# Patient Record
Sex: MALE | Race: WHITE | Employment: OTHER | URBAN - NONMETROPOLITAN AREA
[De-identification: names, ages, dates, MRNs, and addresses within clinical notes are randomized per-mention and may not be internally consistent; named-entity substitution may affect disease eponyms.]

---

## 2018-10-09 ENCOUNTER — HOSPITAL ENCOUNTER (INPATIENT)
Age: 78
LOS: 8 days | Discharge: NURSING, REHAB OR LONG-TERM CARE | DRG: 481 | End: 2018-10-17
Attending: EMERGENCY MEDICINE | Admitting: ORTHOPAEDIC SURGERY
Payer: MEDICARE

## 2018-10-09 ENCOUNTER — APPOINTMENT (OUTPATIENT)
Dept: CT IMAGING | Age: 78
DRG: 481 | End: 2018-10-09
Payer: MEDICARE

## 2018-10-09 ENCOUNTER — APPOINTMENT (OUTPATIENT)
Dept: GENERAL RADIOLOGY | Age: 78
DRG: 481 | End: 2018-10-09
Payer: MEDICARE

## 2018-10-09 DIAGNOSIS — S72.001A HIP FRACTURE REQUIRING OPERATIVE REPAIR, RIGHT, CLOSED, INITIAL ENCOUNTER (HCC): ICD-10-CM

## 2018-10-09 DIAGNOSIS — M79.604 RIGHT LEG PAIN: Primary | ICD-10-CM

## 2018-10-09 LAB
ANION GAP SERPL CALCULATED.3IONS-SCNC: 12 MEQ/L (ref 8–16)
BASOPHILS # BLD: 0.3 %
BASOPHILS ABSOLUTE: 0 THOU/MM3 (ref 0–0.1)
BUN BLDV-MCNC: 37 MG/DL (ref 7–22)
CALCIUM SERPL-MCNC: 9.2 MG/DL (ref 8.5–10.5)
CHLORIDE BLD-SCNC: 102 MEQ/L (ref 98–111)
CO2: 29 MEQ/L (ref 23–33)
CREAT SERPL-MCNC: 1.6 MG/DL (ref 0.4–1.2)
ELLIPTOCYTES: ABNORMAL
EOSINOPHIL # BLD: 1.7 %
EOSINOPHILS ABSOLUTE: 0.1 THOU/MM3 (ref 0–0.4)
ERYTHROCYTE [DISTWIDTH] IN BLOOD BY AUTOMATED COUNT: 15.6 % (ref 11.5–14.5)
ERYTHROCYTE [DISTWIDTH] IN BLOOD BY AUTOMATED COUNT: 48 FL (ref 35–45)
GFR SERPL CREATININE-BSD FRML MDRD: 42 ML/MIN/1.73M2
GLUCOSE BLD-MCNC: 129 MG/DL (ref 70–108)
HCT VFR BLD CALC: 36.2 % (ref 42–52)
HEMOGLOBIN: 12.8 GM/DL (ref 14–18)
IMMATURE GRANS (ABS): 0.03 THOU/MM3 (ref 0–0.07)
IMMATURE GRANULOCYTES: 0.5 %
LYMPHOCYTES # BLD: 15.5 %
LYMPHOCYTES ABSOLUTE: 1 THOU/MM3 (ref 1–4.8)
MCH RBC QN AUTO: 30.2 PG (ref 26–33)
MCHC RBC AUTO-ENTMCNC: 35.4 GM/DL (ref 32.2–35.5)
MCV RBC AUTO: 85.4 FL (ref 80–94)
MONOCYTES # BLD: 8.6 %
MONOCYTES ABSOLUTE: 0.6 THOU/MM3 (ref 0.4–1.3)
NUCLEATED RED BLOOD CELLS: 0 /100 WBC
OSMOLALITY CALCULATION: 295.4 MOSMOL/KG (ref 275–300)
PLATELET # BLD: 82 THOU/MM3 (ref 130–400)
PLATELET ESTIMATE: ABNORMAL
PMV BLD AUTO: 9.7 FL (ref 9.4–12.4)
POTASSIUM SERPL-SCNC: 4.4 MEQ/L (ref 3.5–5.2)
RBC # BLD: 4.24 MILL/MM3 (ref 4.7–6.1)
SCAN OF BLOOD SMEAR: NORMAL
SEG NEUTROPHILS: 73.4 %
SEGMENTED NEUTROPHILS ABSOLUTE COUNT: 4.9 THOU/MM3 (ref 1.8–7.7)
SODIUM BLD-SCNC: 143 MEQ/L (ref 135–145)
SPHEROCYTES: ABNORMAL
WBC # BLD: 6.7 THOU/MM3 (ref 4.8–10.8)

## 2018-10-09 PROCEDURE — 99285 EMERGENCY DEPT VISIT HI MDM: CPT

## 2018-10-09 PROCEDURE — 70450 CT HEAD/BRAIN W/O DYE: CPT

## 2018-10-09 PROCEDURE — 96374 THER/PROPH/DIAG INJ IV PUSH: CPT

## 2018-10-09 PROCEDURE — 80048 BASIC METABOLIC PNL TOTAL CA: CPT

## 2018-10-09 PROCEDURE — 6370000000 HC RX 637 (ALT 250 FOR IP): Performed by: PHYSICIAN ASSISTANT

## 2018-10-09 PROCEDURE — 85025 COMPLETE CBC W/AUTO DIFF WBC: CPT

## 2018-10-09 PROCEDURE — 6360000002 HC RX W HCPCS: Performed by: EMERGENCY MEDICINE

## 2018-10-09 PROCEDURE — 96375 TX/PRO/DX INJ NEW DRUG ADDON: CPT

## 2018-10-09 PROCEDURE — 99223 1ST HOSP IP/OBS HIGH 75: CPT | Performed by: HOSPITALIST

## 2018-10-09 PROCEDURE — 51702 INSERT TEMP BLADDER CATH: CPT

## 2018-10-09 PROCEDURE — 6360000002 HC RX W HCPCS: Performed by: PHYSICIAN ASSISTANT

## 2018-10-09 PROCEDURE — 2709999900 HC NON-CHARGEABLE SUPPLY

## 2018-10-09 PROCEDURE — 1200000003 HC TELEMETRY R&B

## 2018-10-09 PROCEDURE — 2580000003 HC RX 258: Performed by: PHYSICIAN ASSISTANT

## 2018-10-09 PROCEDURE — 73502 X-RAY EXAM HIP UNI 2-3 VIEWS: CPT

## 2018-10-09 PROCEDURE — 2580000003 HC RX 258: Performed by: HOSPITALIST

## 2018-10-09 PROCEDURE — 71045 X-RAY EXAM CHEST 1 VIEW: CPT

## 2018-10-09 PROCEDURE — 36415 COLL VENOUS BLD VENIPUNCTURE: CPT

## 2018-10-09 PROCEDURE — 72125 CT NECK SPINE W/O DYE: CPT

## 2018-10-09 RX ORDER — FUROSEMIDE 20 MG/1
20 TABLET ORAL 2 TIMES DAILY
Status: ON HOLD | COMMUNITY
End: 2018-10-16 | Stop reason: HOSPADM

## 2018-10-09 RX ORDER — SERTRALINE HYDROCHLORIDE 100 MG/1
100 TABLET, FILM COATED ORAL DAILY
Status: DISCONTINUED | OUTPATIENT
Start: 2018-10-10 | End: 2018-10-17 | Stop reason: HOSPADM

## 2018-10-09 RX ORDER — ZOLPIDEM TARTRATE 10 MG/1
TABLET ORAL NIGHTLY PRN
Status: ON HOLD | COMMUNITY
End: 2018-10-16 | Stop reason: ALTCHOICE

## 2018-10-09 RX ORDER — LOSARTAN POTASSIUM 100 MG/1
100 TABLET ORAL DAILY
Status: DISCONTINUED | OUTPATIENT
Start: 2018-10-10 | End: 2018-10-10

## 2018-10-09 RX ORDER — POTASSIUM CHLORIDE 750 MG/1
10 CAPSULE, EXTENDED RELEASE ORAL 2 TIMES DAILY
Status: ON HOLD | COMMUNITY
End: 2018-10-16

## 2018-10-09 RX ORDER — FUROSEMIDE 20 MG/1
20 TABLET ORAL 2 TIMES DAILY
Status: DISCONTINUED | OUTPATIENT
Start: 2018-10-09 | End: 2018-10-10

## 2018-10-09 RX ORDER — HYDROCODONE BITARTRATE AND ACETAMINOPHEN 5; 325 MG/1; MG/1
1 TABLET ORAL EVERY 6 HOURS PRN
Status: DISCONTINUED | OUTPATIENT
Start: 2018-10-09 | End: 2018-10-11

## 2018-10-09 RX ORDER — MORPHINE SULFATE 2 MG/ML
2 INJECTION, SOLUTION INTRAMUSCULAR; INTRAVENOUS
Status: DISCONTINUED | OUTPATIENT
Start: 2018-10-09 | End: 2018-10-11

## 2018-10-09 RX ORDER — AMLODIPINE BESYLATE 2.5 MG/1
2.5 TABLET ORAL DAILY
Status: DISCONTINUED | OUTPATIENT
Start: 2018-10-10 | End: 2018-10-10

## 2018-10-09 RX ORDER — AMLODIPINE BESYLATE 10 MG/1
2.5 TABLET ORAL DAILY
Status: ON HOLD | COMMUNITY
End: 2018-10-16 | Stop reason: HOSPADM

## 2018-10-09 RX ORDER — PREGABALIN 75 MG/1
75 CAPSULE ORAL 2 TIMES DAILY
Status: ON HOLD | COMMUNITY
End: 2018-10-16 | Stop reason: HOSPADM

## 2018-10-09 RX ORDER — TIZANIDINE 4 MG/1
4 TABLET ORAL NIGHTLY
Status: DISCONTINUED | OUTPATIENT
Start: 2018-10-10 | End: 2018-10-17 | Stop reason: HOSPADM

## 2018-10-09 RX ORDER — METOPROLOL TARTRATE 100 MG/1
100 TABLET ORAL 2 TIMES DAILY
Status: DISCONTINUED | OUTPATIENT
Start: 2018-10-10 | End: 2018-10-10

## 2018-10-09 RX ORDER — INDOMETHACIN 75 MG/1
75 CAPSULE, EXTENDED RELEASE ORAL 2 TIMES DAILY WITH MEALS
COMMUNITY

## 2018-10-09 RX ORDER — LOSARTAN POTASSIUM 100 MG/1
100 TABLET ORAL DAILY
Status: ON HOLD | COMMUNITY
End: 2018-10-16 | Stop reason: HOSPADM

## 2018-10-09 RX ORDER — MORPHINE SULFATE 2 MG/ML
1 INJECTION, SOLUTION INTRAMUSCULAR; INTRAVENOUS
Status: DISCONTINUED | OUTPATIENT
Start: 2018-10-09 | End: 2018-10-17 | Stop reason: HOSPADM

## 2018-10-09 RX ORDER — SERTRALINE HYDROCHLORIDE 100 MG/1
100 TABLET, FILM COATED ORAL DAILY
COMMUNITY

## 2018-10-09 RX ORDER — MORPHINE SULFATE 4 MG/ML
4 INJECTION, SOLUTION INTRAMUSCULAR; INTRAVENOUS ONCE
Status: COMPLETED | OUTPATIENT
Start: 2018-10-09 | End: 2018-10-09

## 2018-10-09 RX ORDER — POTASSIUM CHLORIDE 750 MG/1
10 TABLET, FILM COATED, EXTENDED RELEASE ORAL 2 TIMES DAILY
Status: DISCONTINUED | OUTPATIENT
Start: 2018-10-09 | End: 2018-10-17 | Stop reason: HOSPADM

## 2018-10-09 RX ORDER — 0.9 % SODIUM CHLORIDE 0.9 %
500 INTRAVENOUS SOLUTION INTRAVENOUS ONCE
Status: COMPLETED | OUTPATIENT
Start: 2018-10-10 | End: 2018-10-10

## 2018-10-09 RX ORDER — ONDANSETRON 2 MG/ML
4 INJECTION INTRAMUSCULAR; INTRAVENOUS ONCE
Status: COMPLETED | OUTPATIENT
Start: 2018-10-09 | End: 2018-10-09

## 2018-10-09 RX ORDER — METOPROLOL TARTRATE 100 MG/1
100 TABLET ORAL DAILY
COMMUNITY

## 2018-10-09 RX ORDER — TIZANIDINE 4 MG/1
4 TABLET ORAL EVERY 6 HOURS PRN
Status: ON HOLD | COMMUNITY
End: 2018-10-16 | Stop reason: ALTCHOICE

## 2018-10-09 RX ORDER — CLOPIDOGREL BISULFATE 75 MG/1
75 TABLET ORAL DAILY
COMMUNITY

## 2018-10-09 RX ORDER — SODIUM CHLORIDE 9 MG/ML
INJECTION, SOLUTION INTRAVENOUS CONTINUOUS
Status: ACTIVE | OUTPATIENT
Start: 2018-10-09 | End: 2018-10-10

## 2018-10-09 RX ADMIN — HYDROCODONE BITARTRATE AND ACETAMINOPHEN 1 TABLET: 5; 325 TABLET ORAL at 22:36

## 2018-10-09 RX ADMIN — SODIUM CHLORIDE 500 ML: 9 INJECTION, SOLUTION INTRAVENOUS at 23:54

## 2018-10-09 RX ADMIN — ONDANSETRON HYDROCHLORIDE 4 MG: 4 INJECTION, SOLUTION INTRAMUSCULAR; INTRAVENOUS at 17:22

## 2018-10-09 RX ADMIN — MORPHINE SULFATE 4 MG: 4 INJECTION, SOLUTION INTRAMUSCULAR; INTRAVENOUS at 17:21

## 2018-10-09 RX ADMIN — MORPHINE SULFATE 4 MG: 4 INJECTION, SOLUTION INTRAMUSCULAR; INTRAVENOUS at 19:33

## 2018-10-09 RX ADMIN — SODIUM CHLORIDE: 9 INJECTION, SOLUTION INTRAVENOUS at 21:36

## 2018-10-09 RX ADMIN — MORPHINE SULFATE 2 MG: 2 INJECTION, SOLUTION INTRAMUSCULAR; INTRAVENOUS at 21:36

## 2018-10-09 ASSESSMENT — PAIN DESCRIPTION - FREQUENCY: FREQUENCY: INTERMITTENT

## 2018-10-09 ASSESSMENT — PAIN DESCRIPTION - PROGRESSION: CLINICAL_PROGRESSION: GRADUALLY WORSENING

## 2018-10-09 ASSESSMENT — PAIN SCALES - GENERAL
PAINLEVEL_OUTOF10: 7
PAINLEVEL_OUTOF10: 8
PAINLEVEL_OUTOF10: 9
PAINLEVEL_OUTOF10: 8

## 2018-10-09 ASSESSMENT — PAIN DESCRIPTION - LOCATION: LOCATION: HIP

## 2018-10-09 ASSESSMENT — PAIN DESCRIPTION - ONSET: ONSET: GRADUAL

## 2018-10-09 ASSESSMENT — PAIN DESCRIPTION - PAIN TYPE: TYPE: ACUTE PAIN

## 2018-10-09 ASSESSMENT — PAIN DESCRIPTION - ORIENTATION: ORIENTATION: RIGHT

## 2018-10-09 ASSESSMENT — PAIN DESCRIPTION - DESCRIPTORS: DESCRIPTORS: SHARP

## 2018-10-09 NOTE — ED NOTES
Patient to ed room 8 from main lobby. Patient fell in the lobby and complains of right hip and leg pain and laceration to back of head. Patient in gown on monitor, vital signs obtained and assessment completed.      Ladonna Fallon RN  10/09/18 [id number]

## 2018-10-09 NOTE — LETTER
suppliers that help patients recover after discharge from the hospital, including skilled nursing facilities, home health agencies, inpatient rehabilitation facilities, and long term care hospitals. [facility] is working closely with the doctors and other health care providers that care for you during and following your hospital stay and for a period of time after you leave the hospital. By working together, the health care providers are trying to more efficiently provide well-managed, high quality, patient-centered care as you undergo treatment. Hospitals, doctors, and other health care providers that care for you following a hospital stay may receive an additional payment for providing better, more coordinated health care. Medicare will monitor your care to make sure you and others get high quality care. Your feedback is important     Medicare may also ask you to answer a survey about the services and care you received from [facility]. The survey will be mailed to you. Your feedback will improve care for all people with Medicare who receive care from [facility]. Completion of this survey is optional.     Get more information     For more information about the Bundled Payments for [facility], you can:    · Visit the CMS BPCI Advanced Website at http://faye-landis.net/ initiatives/bpci-advanced   · Call the [facility] BPCI-A team at (332) [phone]. · Call 1-800-MEDICARE (4-282.800.7517). TTY users can call 6-182.685.7443     If you have concerns or complaints about your care, talk to your health care provider, or contact your Beneficiary and Family Centered Quality Improvement Organization NOEMI BARRERA [facility]). To get your CC-QIO's phone number, visit Medicare.gov/contacts or call 1-800-MEDICARE. · To find a different hospital, visit [email or web address] or call

## 2018-10-10 LAB
ANION GAP SERPL CALCULATED.3IONS-SCNC: 11 MEQ/L (ref 8–16)
ANION GAP SERPL CALCULATED.3IONS-SCNC: 11 MEQ/L (ref 8–16)
BUN BLDV-MCNC: 43 MG/DL (ref 7–22)
BUN BLDV-MCNC: 46 MG/DL (ref 7–22)
CALCIUM SERPL-MCNC: 8.2 MG/DL (ref 8.5–10.5)
CALCIUM SERPL-MCNC: 8.3 MG/DL (ref 8.5–10.5)
CHLORIDE BLD-SCNC: 103 MEQ/L (ref 98–111)
CHLORIDE BLD-SCNC: 104 MEQ/L (ref 98–111)
CO2: 27 MEQ/L (ref 23–33)
CO2: 28 MEQ/L (ref 23–33)
CREAT SERPL-MCNC: 2.7 MG/DL (ref 0.4–1.2)
CREAT SERPL-MCNC: 2.7 MG/DL (ref 0.4–1.2)
CREATININE URINE: 189 MG/DL
EKG ATRIAL RATE: 55 BPM
EKG P AXIS: 13 DEGREES
EKG P-R INTERVAL: 208 MS
EKG Q-T INTERVAL: 452 MS
EKG QRS DURATION: 94 MS
EKG QTC CALCULATION (BAZETT): 432 MS
EKG R AXIS: 25 DEGREES
EKG T AXIS: 34 DEGREES
EKG VENTRICULAR RATE: 55 BPM
ERYTHROCYTE [DISTWIDTH] IN BLOOD BY AUTOMATED COUNT: 16.4 % (ref 11.5–14.5)
ERYTHROCYTE [DISTWIDTH] IN BLOOD BY AUTOMATED COUNT: 54.4 FL (ref 35–45)
GFR SERPL CREATININE-BSD FRML MDRD: 23 ML/MIN/1.73M2
GFR SERPL CREATININE-BSD FRML MDRD: 23 ML/MIN/1.73M2
GLUCOSE BLD-MCNC: 101 MG/DL (ref 70–108)
GLUCOSE BLD-MCNC: 119 MG/DL (ref 70–108)
HCT VFR BLD CALC: 29.8 % (ref 42–52)
HCT VFR BLD CALC: 30.9 % (ref 42–52)
HEMOGLOBIN: 10 GM/DL (ref 14–18)
HEMOGLOBIN: 10.3 GM/DL (ref 14–18)
LV EF: 50 %
LVEF MODALITY: NORMAL
MCH RBC QN AUTO: 30.5 PG (ref 26–33)
MCHC RBC AUTO-ENTMCNC: 33.3 GM/DL (ref 32.2–35.5)
MCV RBC AUTO: 91.4 FL (ref 80–94)
PLATELET # BLD: 65 THOU/MM3 (ref 130–400)
PMV BLD AUTO: 9.4 FL (ref 9.4–12.4)
POTASSIUM SERPL-SCNC: 4.6 MEQ/L (ref 3.5–5.2)
POTASSIUM SERPL-SCNC: 4.8 MEQ/L (ref 3.5–5.2)
RBC # BLD: 3.38 MILL/MM3 (ref 4.7–6.1)
SODIUM BLD-SCNC: 142 MEQ/L (ref 135–145)
SODIUM BLD-SCNC: 142 MEQ/L (ref 135–145)
SODIUM URINE: 29 MEQ/L
WBC # BLD: 7.2 THOU/MM3 (ref 4.8–10.8)

## 2018-10-10 PROCEDURE — 6370000000 HC RX 637 (ALT 250 FOR IP): Performed by: PHYSICIAN ASSISTANT

## 2018-10-10 PROCEDURE — 2709999900 HC NON-CHARGEABLE SUPPLY

## 2018-10-10 PROCEDURE — 93010 ELECTROCARDIOGRAM REPORT: CPT | Performed by: INTERNAL MEDICINE

## 2018-10-10 PROCEDURE — 82570 ASSAY OF URINE CREATININE: CPT

## 2018-10-10 PROCEDURE — 99233 SBSQ HOSP IP/OBS HIGH 50: CPT | Performed by: HOSPITALIST

## 2018-10-10 PROCEDURE — 1200000003 HC TELEMETRY R&B

## 2018-10-10 PROCEDURE — 93306 TTE W/DOPPLER COMPLETE: CPT

## 2018-10-10 PROCEDURE — 84300 ASSAY OF URINE SODIUM: CPT

## 2018-10-10 PROCEDURE — 81001 URINALYSIS AUTO W/SCOPE: CPT

## 2018-10-10 PROCEDURE — 36415 COLL VENOUS BLD VENIPUNCTURE: CPT

## 2018-10-10 PROCEDURE — 85027 COMPLETE CBC AUTOMATED: CPT

## 2018-10-10 PROCEDURE — 6360000002 HC RX W HCPCS: Performed by: PHYSICIAN ASSISTANT

## 2018-10-10 PROCEDURE — 80048 BASIC METABOLIC PNL TOTAL CA: CPT

## 2018-10-10 PROCEDURE — 51702 INSERT TEMP BLADDER CATH: CPT

## 2018-10-10 PROCEDURE — 85014 HEMATOCRIT: CPT

## 2018-10-10 PROCEDURE — 93005 ELECTROCARDIOGRAM TRACING: CPT | Performed by: NURSE PRACTITIONER

## 2018-10-10 PROCEDURE — 85018 HEMOGLOBIN: CPT

## 2018-10-10 RX ADMIN — MORPHINE SULFATE 2 MG: 2 INJECTION, SOLUTION INTRAMUSCULAR; INTRAVENOUS at 14:35

## 2018-10-10 RX ADMIN — MORPHINE SULFATE 1 MG: 2 INJECTION, SOLUTION INTRAMUSCULAR; INTRAVENOUS at 21:17

## 2018-10-10 RX ADMIN — POTASSIUM CHLORIDE 10 MEQ: 750 TABLET, FILM COATED, EXTENDED RELEASE ORAL at 04:40

## 2018-10-10 RX ADMIN — MORPHINE SULFATE 2 MG: 2 INJECTION, SOLUTION INTRAMUSCULAR; INTRAVENOUS at 17:26

## 2018-10-10 RX ADMIN — POTASSIUM CHLORIDE 10 MEQ: 750 TABLET, FILM COATED, EXTENDED RELEASE ORAL at 21:18

## 2018-10-10 RX ADMIN — HYDROCODONE BITARTRATE AND ACETAMINOPHEN 1 TABLET: 5; 325 TABLET ORAL at 06:56

## 2018-10-10 RX ADMIN — POTASSIUM CHLORIDE 10 MEQ: 750 TABLET, FILM COATED, EXTENDED RELEASE ORAL at 09:55

## 2018-10-10 RX ADMIN — SERTRALINE 100 MG: 100 TABLET, FILM COATED ORAL at 09:55

## 2018-10-10 RX ADMIN — HYDROCODONE BITARTRATE AND ACETAMINOPHEN 1 TABLET: 5; 325 TABLET ORAL at 16:07

## 2018-10-10 RX ADMIN — MORPHINE SULFATE 2 MG: 2 INJECTION, SOLUTION INTRAMUSCULAR; INTRAVENOUS at 04:40

## 2018-10-10 RX ADMIN — MORPHINE SULFATE 2 MG: 2 INJECTION, SOLUTION INTRAMUSCULAR; INTRAVENOUS at 00:46

## 2018-10-10 RX ADMIN — TIZANIDINE 4 MG: 4 TABLET ORAL at 21:17

## 2018-10-10 ASSESSMENT — PAIN DESCRIPTION - ORIENTATION
ORIENTATION: RIGHT

## 2018-10-10 ASSESSMENT — PAIN SCALES - GENERAL
PAINLEVEL_OUTOF10: 3
PAINLEVEL_OUTOF10: 7
PAINLEVEL_OUTOF10: 8
PAINLEVEL_OUTOF10: 7
PAINLEVEL_OUTOF10: 2
PAINLEVEL_OUTOF10: 3
PAINLEVEL_OUTOF10: 4
PAINLEVEL_OUTOF10: 5
PAINLEVEL_OUTOF10: 7
PAINLEVEL_OUTOF10: 3
PAINLEVEL_OUTOF10: 0
PAINLEVEL_OUTOF10: 8
PAINLEVEL_OUTOF10: 7
PAINLEVEL_OUTOF10: 8

## 2018-10-10 ASSESSMENT — PAIN DESCRIPTION - LOCATION
LOCATION: HIP

## 2018-10-10 ASSESSMENT — PAIN DESCRIPTION - PAIN TYPE
TYPE: ACUTE PAIN

## 2018-10-10 ASSESSMENT — PAIN DESCRIPTION - PROGRESSION
CLINICAL_PROGRESSION: GRADUALLY IMPROVING

## 2018-10-10 ASSESSMENT — PAIN DESCRIPTION - FREQUENCY
FREQUENCY: INTERMITTENT

## 2018-10-10 ASSESSMENT — PAIN DESCRIPTION - ONSET
ONSET: GRADUAL

## 2018-10-10 ASSESSMENT — PAIN DESCRIPTION - DESCRIPTORS
DESCRIPTORS: CONSTANT

## 2018-10-10 NOTE — CARE COORDINATION
10/10/18, 8:20 AM      Iva Clark       Admitted from: ED 10/9/2018/ [place] 17 day: 1   Location: [address] [place] [address] Reason for admit: Hip fracture requiring operative repair, right, closed, initial encounter ([facility] [address] Shaniqua Moreno Status: IP  Admit order signed?: no  PMH:  has a past medical history of Cerebral artery occlusion with cerebral infarction ([facility] [address] Depression; and Hypertension. Procedure: Echo pending  Pertinent abnormal Imaging:  10/9 XR of right hip  Displaced and angulated proximal right femoral metaphyseal fracture extending to the medial right femoral intertrochanteric region. 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 Postoperative changes of the lumbosacral spine. Medications:  Scheduled Meds:   potassium chloride  10 mEq Oral BID    sertraline  100 mg Oral Daily    tiZANidine  4 mg Oral Nightly     Continuous Infusions:   sodium chloride 100 mL/hr at 10/09/18 2136      Pertinent Info/Orders/Treatment Plan:  Ortho following. Hospitalist consult for surgery clearance. Bedrest. Lucia. Telemetry. Anticoagulants held. Creatinine 2.7 (up from 1.6 yesterday evening). Hgb 10.3. IVF. Pain control. Diet: Diet NPO, After Midnight   Smoking status:  reports that he has quit smoking.  He has never used smokeless tobacco.   PCP: Ольга Estrada MD  Readmission: no  Readmission Risk Score: 6%    Discharge Planning  Current Residence:  Private Residence  Living Arrangements:  Spouse/Significant Other   Support Systems:  None  Current Services PTA:     Potential Assistance Needed:  Transitional Care  Potential Assistance Purchasing Medications:  No  Does patient want to participate in local refill/ meds to beds program?  No  Type of Home Care Services:  None  Patient expects to be discharged to:  home with wife  Expected Discharge date:  10/12/18  Follow Up Appointment: Best Day/ Time: Friday PM    Discharge Plan: Spoke with patient and wife, they live in [facility] like [facility]

## 2018-10-10 NOTE — PROGRESS NOTES
Neurovascularly intact without any focal sensory/motor deficits. Cranial nerves: II-XII intact. Psychiatric: Alert and oriented, thought content appropriate, normal insight  Capillary Refill: Brisk,< 3 seconds   Peripheral Pulses: +2 palpable, equal bilaterally       Labs:   Recent Labs      10/09/18   1723  10/10/18   0555  10/10/18   1536   WBC  6.7  7.2   --    HGB  12.8*  10.3*  10.0*   HCT  36.2*  30.9*  29.8*   PLT  82*  65*   --      Recent Labs      10/09/18   1824  10/10/18   0555  10/10/18   1536   NA  143  142  142   K  4.4  4.6  4.8   CL  102  103  104   CO2  29  28  27   BUN  37*  43*  46*   CREATININE  1.6*  2.7*  2.7*   CALCIUM  9.2  8.3*  8.2*     No results for input(s): AST, ALT, BILIDIR, BILITOT, ALKPHOS in the last 72 hours. No results for input(s): INR in the last 72 hours. No results for input(s): [place] in the last 72 hours. Urinalysis:    No results found for: [place], BACTERIA, RBCUA, BLOODU, [address], GLUCOSEU    Radiology:  Ct Head Wo Contrast    Result Date: 10/9/2018  PROCEDURE: CT HEAD WO CONTRAST CLINICAL INFORMATION: HEADACHE, POST TRAUMA, . COMPARISON: None TECHNIQUE: Noncontrast 5 mm axial images were obtained through the brain. All CT scans at this facility use dose modulation, iterative reconstruction, and/or weight-based dosing when appropriate to reduce radiation dose to as low as reasonably achievable. FINDINGS: Brain: There is no acute ischemic infarct, hemorrhage, midline shift, mass, or mass effect. Mild deep white matter small vessel chronic ischemic changes are noted. There is age appropriate cortical atrophy. Ventricles: The ventricles, cisterns and cortical sulci are enlarged concordant with the mild degree of age-appropriate atrophy. No obstructive hydrocephalus. Skull base/calvarium: Unremarkable Soft tissues: There is a 3.9 x 0.9 cm posterior high right parietal scalp hematoma. Intraorbital contents: Unremarkable Sinuses:  There is minimal right voice recognition technology. ** Final report electronically signed by Dr. Akin Rangel on 10/9/2018 6:40 PM    Xr Hip 2-3 Vw W Pelvis Right    Result Date: 10/9/2018  PROCEDURE: XR HIP 2-3 VW W PELVIS RIGHT CLINICAL INFORMATION: fall/pain, . COMPARISON: No prior study. TECHNIQUE: AP and crosstable lateral views were obtained  of the right hip. AP pelvis xray was also obtained. FINDINGS: Bones/joints: There is an oblique fracture of the proximal right femoral metaphysis extending into the medial intertrochanteric region. There is medial displacement and medial angulation of the distal fracture fragment. There is also slight anterior displacement of the distal fracture fragment. No dislocation. 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 The SI joints are unremarkable. Postoperative changes of the lumbar spine with L4 and L5 laminectomies and multilevel pedicle screw fusion extending to S1. Soft tissues: No significant soft tissue swelling. There are multiple right groin surgical clips. Displaced and angulated proximal right femoral metaphyseal fracture extending to the medial right femoral intertrochanteric region. 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 Postoperative changes of the lumbosacral spine. **This report has been created using voice recognition software. It may contain minor errors which are inherent in voice recognition technology. ** Final report electronically signed by Dr. Akin Rangel on 10/9/2018 6:47 PM      Diet: Diet NPO, After Midnight    DVT prophylaxis: [] Lovenox                                 [x] SCDs                                 [] SQ Heparin                                 [] Encourage ambulation           [] Already on Anticoagulation     Disposition:    [] Home       [] TCU       [] Rehab       [] Psych       [] SNF       [] [place]       []

## 2018-10-10 NOTE — CONSULTS
Consult History & Physical      Patient:  Iva Clark  YOB: 1940    MRN: [id number]     Acct: [de-identified]      Chief Complaint/ Reason for consult. Surgical clearance, low blood pressure      Date of Service: Pt seen/examined in consultation on 10/9/2018    History Of Present Illness:      [de-identified] y.o. male who we are asked to see/evaluate by Ash Alatorre MD for medical management of hypotension. Reports while walking in the hospital hallway during the day, momentarily lost consciousness and fell, landed on this right feet and in the process hit the back of his head on the floor. No prior dizziness, chest pain, shortness of breath, palpitations. Maintains he neither mistepped no slipped. Reports history of multiple falls in the past associated with his gait difficulty, but emphasizes that this fall was different from the previous ones. No diarrhea, vomiting. Of note, patient takes furosemide, amlodipine, losartan on metoprolol for blood pressure control. Past Medical History:        Diagnosis Date    Depression     Hypertension        Past Surgical History:        Procedure Laterality Date    BACK SURGERY      CARDIAC VALVE SURGERY      CHEST SURGERY      CHOLECYSTECTOMY         Medications Prior to Admission:    Prior to Admission medications    Medication Sig Start Date End Date Taking?  Authorizing Provider   amLODIPine (NORVASC) 10 MG tablet Take 2.5 mg by mouth daily    Yes Historical Provider, MD   clopidogrel (PLAVIX) 75 MG tablet Take 75 mg by mouth daily   Yes Historical Provider, MD   furosemide (LASIX) 20 MG tablet Take 20 mg by mouth 2 times daily   Yes Historical Provider, MD   losartan (COZAAR) 100 MG tablet Take 100 mg by mouth daily   Yes Historical Provider, MD   metoprolol (LOPRESSOR) 100 MG tablet Take 100 mg by mouth 2 times daily   Yes Historical Provider, MD   potassium chloride (MICRO-K) 10 MEQ extended release capsule Take 10 mEq by mouth 2 times daily   Yes right-sided descending thoracic aorta. Questionable mass or masslike pneumonia in the mid and lower right lung with peripheral mid and lower right lung haziness possibly representing scarring or pneumonia. Consider chest CT with contrast for further evaluation. Mild left basilar atelectasis. Mild cardiomegaly status post sternotomy reportedly for valve surgery. **This report has been created using voice recognition software. It may contain minor errors which are inherent in voice recognition technology. **      Final report electronically signed by Dr. Nichelle Luke on 10/9/2018 6:40 PM      CT CERVICAL SPINE WO CONTRAST   Final Result    No fracture or subluxation. Postoperative changes of the cervical and thoracic spine as detailed above   Multilevel degenerative disc disease and DJD. Additional findings as detailed above. **This report has been created using voice recognition software. It may contain minor errors which are inherent in voice recognition technology. **      Final report electronically signed by Dr. Nichelle Luke on 10/9/2018 6:11 PM      CT HEAD WO CONTRAST   Final Result      No acute ischemic infarct, hemorrhage, or mass effect. Aging changes as discussed above. Posterior right parietal scalp hematoma. **This report has been created using voice recognition software. It may contain minor errors which are inherent in voice recognition technology. **      Final report electronically signed by Dr. Nichelle Luke on 10/9/2018 5:59 PM             EKG:  I have reviewed the EKG with the following interpretation:Pending      DVT prophylaxis: [] Lovenox                                 [] SCDs                                 [] SQ Heparin                                 [] Encourage ambulation           [] Already on Anticoagulation    Defer pharmacologic anticoagulation to surgeon      Code Status: Full    PT/OT Eval Status: Active and ongoing      ASSESSMENT:    Active

## 2018-10-10 NOTE — CARE COORDINATION
DISCHARGE BARRIERS  10/10/18, 9:53 AM    Reason for Referral: Needs ECF placement. Mental Status: Alert and oreinted  Decision Making: Patient makes is own decisions and has a spouse for support. Family/Social/Home Environment:  Patient reside at home with his wife whom still works. She stated that he has a walker and a raised toilet seat. The shower is walk in and patient is independent in [address] [facility]. Home is one story with one step to get inside  Current Services: NA  Current Equipment: walker and raised toilet seat  Payment Source: Medicare/[employer]ARP  Concerns or Barriers to Discharge: Patient will likely need ECF for therapy after discharge. Collabrative List of ECF/HH were provided: Denied needing a list as they are requesting [facility]. Teach Back Method used with patient and spouse regarding care plan and discharge to [facility]. Patient and spouse verbalize understanding of the plan of care and contribute to goal setting. Anticipated Needs/Discharge Plan: Patient would like to go to [facility] for a short rehab stay. Patient is planned to have surgery tomorrow. Referral made to [facility] and they will look at unsure if they will have a bed. Information faxed and will await call on bed availability.      Electronically signed by FLORENTIN Good on 10/10/2018 at 9:53 AM

## 2018-10-10 NOTE — H&P
tinnitus or vertigo. Resp: Denies any cough or shortness of breath. CV: Denies any syncope, palpitations or chest pain. GI:  Denies any abdominal pain, nausea, vomiting, constipation or diarrhea. : Denies any hematuria, hesitancy, or dysuria. Heme/Lymph: Denies any bleeding. Musculoskeletal: Positive for right hip pain. Neuro: Denies any dizziness, paresthesia or weakness. PHYSICAL EXAM:  Patient Vitals for the past 24 hrs:   BP Temp Temp src Pulse Resp SpO2   10/10/18 0440 (!) 94/50 98.4 °F (36.9 °C) Oral 51 16 -   10/09/18 2300 (!) 92/52 - - - - -   10/09/18 2015 (!) 88/50 98.7 °F (37.1 °C) Oral 55 16 95 %   10/09/18 1823 (!) 99/59 - - 58 - (!) 87 %   10/09/18 1705 105/61 98.3 °F (36.8 °C) Oral 58 16 99 %     General appearance:  Alert and oriented x 3. No apparent distress, appears stated age and cooperative. HEENT:  Normal cephalic, atraumatic without obvious deformity. Pupils equal, round, and reactive to light. Conjunctivae/corneas clear. Neck: Supple, with full range of motion. Trachea midline. Respiratory:  Normal respiratory effort. No audible Wheezes or Rhonchi. Cardiovascular:  Regular rate and rhythm. Abdomen: Soft, non-tender, non-distended. Musculoskeletal:  RLE shortened and externally rotated. Flex and extends toes and ankle right. Decreased ROM RLE secondary to pain. Calf soft non-tender to palpation. Good sensation to light touch. Skin: Skin color, texture, turgor normal.  No rashes or lesions. Neurologic:  Neurovascularly intact without any focal sensory/motor deficits. Sensation intact.    Capillary Refill: Brisk,< 3 seconds   Peripheral Pulses: +2 palpable, equal bilaterally    DATA:  CBC:   Lab Results   Component Value Date    WBC 7.2 10/10/2018    HGB 10.3 10/10/2018    PLT 65 10/10/2018     BMP:  Lab Results   Component Value Date     10/10/2018    K 4.6 10/10/2018     10/10/2018    CO2 28 10/10/2018    BUN 43 10/10/2018    CREATININE 2.7 10/10/2018 coronal reconstructions. All CT scans at this facility use dose modulation, iterative reconstruction, and/or weight-based dosing when appropriate to reduce radiation dose to as low as reasonably achievable. FINDINGS: Vertebrae: Patient is status post C3-T1 laminectomies and bilateral pedicle screw fusion from at least C3-T3, excluding T1. Distal extent of the fusion hardware is not imaged. There is no acute fracture. There is no subluxation. There is multilevel endplate spondylosis, uncovertebral joint DJD, and facet joint DJD. Disc spaces/neural foramina: There is moderate C5-6 and C6-7 disc space narrowing consistent with degenerative disc disease. There is posterior hypertrophic spurring at the C5-6 and C6-7 levels which may impinge upon the spinal cord. There multilevel bilateral mild neural foraminal stenoses. Spinal canal: There is no obvious epidural hematoma. Soft tissues: Prevertebral soft tissues are normal. Imaged lungs: The imaged lung apices are clear. Sinuses: The imaged sinuses are clear. . Mastoids: The imaged mastoid air cells are clear. Other: There is an old healed fracture of the posterior right first rib. Importantly imaged metallic density right apex and there are surgical clips in the left apex. No fracture or subluxation. Postoperative changes of the cervical and thoracic spine as detailed above Multilevel degenerative disc disease and DJD. Additional findings as detailed above. **This report has been created using voice recognition software. It may contain minor errors which are inherent in voice recognition technology. ** Final report electronically signed by Dr. Iva Miner on 10/9/2018 6:11 PM    Xr Chest 1 Vw    Result Date: 10/9/2018  PROCEDURE: XR CHEST 1 VIEW CLINICAL INFORMATION: hip fx, . COMPARISON: No prior study. TECHNIQUE: AP Portable chest xray FINDINGS: Lungs/pleura:   There is an ovoid somewhat masslike opacity in the mid and lower right hemithorax which  may represent a mass or masslike pneumonia. There is also haziness in the periphery of the mid and lower right lung could represent an infiltrate or scarring. There is mild left basilar atelectasis. No pleural effusion. No pneumothorax. Heart: There is mild cardiomegaly. Patient is status post sternotomy history reportedly for valve surgery. Mediastinum/nessa: There is a right aortic arch and the descending thoracic aorta is on the right. Patient is status post placement of a aortic stent graft extending from the arch to the distal descending thoracic aorta. Skeleton: Patient is status post lower cervical to upper thoracic multilevel pedicle screw fusion. Lines/tubes/devices: none Soft tissues: There are surgical clips over the left supraclavicular fossa. Right aortic arch and right-sided descending thoracic aorta. Questionable mass or masslike pneumonia in the mid and lower right lung with peripheral mid and lower right lung haziness possibly representing scarring or pneumonia. Consider chest CT with contrast for further evaluation. Mild left basilar atelectasis. Mild cardiomegaly status post sternotomy reportedly for valve surgery. **This report has been created using voice recognition software. It may contain minor errors which are inherent in voice recognition technology. ** Final report electronically signed by Dr. Abraham Dominguez on 10/9/2018 6:40 PM    Xr Hip 2-3 Vw W Pelvis Right    Result Date: 10/9/2018  PROCEDURE: XR HIP 2-3 VW W PELVIS RIGHT CLINICAL INFORMATION: fall/pain, . COMPARISON: No prior study. TECHNIQUE: AP and crosstable lateral views were obtained  of the right hip. AP pelvis xray was also obtained. FINDINGS: Bones/joints: There is an oblique fracture of the proximal right femoral metaphysis extending into the medial intertrochanteric region. There is medial displacement and medial angulation of the distal fracture fragment. There is also slight anterior displacement of the distal fracture fragment.  No dislocation. 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 The SI joints are unremarkable. Postoperative changes of the lumbar spine with L4 and L5 laminectomies and multilevel pedicle screw fusion extending to S1. Soft tissues: No significant soft tissue swelling. There are multiple right groin surgical clips. Displaced and angulated proximal right femoral metaphyseal fracture extending to the medial right femoral intertrochanteric region. 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 Postoperative changes of the lumbosacral spine. **This report has been created using voice recognition software. It may contain minor errors which are inherent in voice recognition technology. ** Final report electronically signed by Dr. Candice Garsia on 10/9/2018 6:47 PM      ASSESSMENT:Active Problems:    Hip fracture requiring operative repair, right, closed, initial encounter [facility])  Resolved Problems:    * No resolved hospital problems. *       PLAN as discussed with Dr. Kulkarni Heads:  Consent for right hip hemiarthroplasty. Procedure discussed with patient and he consents. Questions answered  Waiting for clearance  ekg and echo ordered. Hold all anticoagulants. Bedrest.  Ice to affected area.        Electronically signed by MILES Smith CNP on 10/10/2018 at 7:26 AM

## 2018-10-11 ENCOUNTER — ANESTHESIA EVENT (OUTPATIENT)
Dept: OPERATING ROOM | Age: 78
DRG: 481 | End: 2018-10-11
Payer: MEDICARE

## 2018-10-11 ENCOUNTER — APPOINTMENT (OUTPATIENT)
Dept: GENERAL RADIOLOGY | Age: 78
DRG: 481 | End: 2018-10-11
Payer: MEDICARE

## 2018-10-11 ENCOUNTER — ANESTHESIA (OUTPATIENT)
Dept: OPERATING ROOM | Age: 78
DRG: 481 | End: 2018-10-11
Payer: MEDICARE

## 2018-10-11 VITALS
TEMPERATURE: 97.3 F | DIASTOLIC BLOOD PRESSURE: 73 MMHG | OXYGEN SATURATION: 100 % | SYSTOLIC BLOOD PRESSURE: 109 MMHG | RESPIRATION RATE: 13 BRPM

## 2018-10-11 LAB
ABO: NORMAL
AMORPHOUS: ABNORMAL
ANION GAP SERPL CALCULATED.3IONS-SCNC: 11 MEQ/L (ref 8–16)
ANION GAP SERPL CALCULATED.3IONS-SCNC: 12 MEQ/L (ref 8–16)
ANTIBODY SCREEN: NORMAL
BACTERIA: ABNORMAL
BASOPHILS # BLD: 0.2 %
BASOPHILS ABSOLUTE: 0 THOU/MM3 (ref 0–0.1)
BILIRUBIN URINE: ABNORMAL
BLOOD, URINE: NEGATIVE
BUN BLDV-MCNC: 45 MG/DL (ref 7–22)
BUN BLDV-MCNC: 45 MG/DL (ref 7–22)
CALCIUM SERPL-MCNC: 7.7 MG/DL (ref 8.5–10.5)
CALCIUM SERPL-MCNC: 8.3 MG/DL (ref 8.5–10.5)
CASTS: ABNORMAL /LPF
CASTS: ABNORMAL /LPF
CHARACTER, URINE: ABNORMAL
CHLORIDE BLD-SCNC: 106 MEQ/L (ref 98–111)
CHLORIDE BLD-SCNC: 107 MEQ/L (ref 98–111)
CO2: 22 MEQ/L (ref 23–33)
CO2: 23 MEQ/L (ref 23–33)
COLOR: YELLOW
CREAT SERPL-MCNC: 1.9 MG/DL (ref 0.4–1.2)
CREAT SERPL-MCNC: 2 MG/DL (ref 0.4–1.2)
CRYSTALS: ABNORMAL
EOSINOPHIL # BLD: 0 %
EOSINOPHILS ABSOLUTE: 0 THOU/MM3 (ref 0–0.4)
EPITHELIAL CELLS, UA: ABNORMAL /HPF
ERYTHROCYTE [DISTWIDTH] IN BLOOD BY AUTOMATED COUNT: 16.6 % (ref 11.5–14.5)
ERYTHROCYTE [DISTWIDTH] IN BLOOD BY AUTOMATED COUNT: 16.6 % (ref 11.5–14.5)
ERYTHROCYTE [DISTWIDTH] IN BLOOD BY AUTOMATED COUNT: 55.2 FL (ref 35–45)
ERYTHROCYTE [DISTWIDTH] IN BLOOD BY AUTOMATED COUNT: 56.2 FL (ref 35–45)
GFR SERPL CREATININE-BSD FRML MDRD: 32 ML/MIN/1.73M2
GFR SERPL CREATININE-BSD FRML MDRD: 34 ML/MIN/1.73M2
GLUCOSE BLD-MCNC: 143 MG/DL (ref 70–108)
GLUCOSE BLD-MCNC: 94 MG/DL (ref 70–108)
GLUCOSE, URINE: NEGATIVE MG/DL
HCT VFR BLD CALC: 21.9 % (ref 42–52)
HCT VFR BLD CALC: 22.4 % (ref 42–52)
HCT VFR BLD CALC: 24.6 % (ref 42–52)
HCT VFR BLD CALC: 26.3 % (ref 42–52)
HCT VFR BLD CALC: 26.7 % (ref 42–52)
HCT VFR BLD CALC: 27.1 % (ref 42–52)
HEMOGLOBIN: 7.4 GM/DL (ref 14–18)
HEMOGLOBIN: 7.4 GM/DL (ref 14–18)
HEMOGLOBIN: 8.3 GM/DL (ref 14–18)
HEMOGLOBIN: 8.9 GM/DL (ref 14–18)
HEMOGLOBIN: 9 GM/DL (ref 14–18)
HEMOGLOBIN: 9 GM/DL (ref 14–18)
ICTOTEST: NEGATIVE
IMMATURE GRANS (ABS): 0.06 THOU/MM3 (ref 0–0.07)
IMMATURE GRANULOCYTES: 0.5 %
INR BLD: 1.1 (ref 0.85–1.13)
KETONES, URINE: NEGATIVE
LEUKOCYTE ESTERASE, URINE: NEGATIVE
LYMPHOCYTES # BLD: 3.6 %
LYMPHOCYTES ABSOLUTE: 0.4 THOU/MM3 (ref 1–4.8)
MCH RBC QN AUTO: 30.6 PG (ref 26–33)
MCH RBC QN AUTO: 30.8 PG (ref 26–33)
MCHC RBC AUTO-ENTMCNC: 32.8 GM/DL (ref 32.2–35.5)
MCHC RBC AUTO-ENTMCNC: 33.8 GM/DL (ref 32.2–35.5)
MCV RBC AUTO: 91.3 FL (ref 80–94)
MCV RBC AUTO: 93.1 FL (ref 80–94)
MISCELLANEOUS LAB TEST RESULT: ABNORMAL
MONOCYTES # BLD: 8.9 %
MONOCYTES ABSOLUTE: 1 THOU/MM3 (ref 0.4–1.3)
MUCUS: ABNORMAL
NITRITE, URINE: NEGATIVE
NUCLEATED RED BLOOD CELLS: 0 /100 WBC
PH UA: 5.5
PLATELET # BLD: 59 THOU/MM3 (ref 130–400)
PLATELET # BLD: 77 THOU/MM3 (ref 130–400)
PMV BLD AUTO: 9.2 FL (ref 9.4–12.4)
PMV BLD AUTO: 9.5 FL (ref 9.4–12.4)
POTASSIUM SERPL-SCNC: 4.5 MEQ/L (ref 3.5–5.2)
POTASSIUM SERPL-SCNC: 4.8 MEQ/L (ref 3.5–5.2)
PROTEIN UA: ABNORMAL MG/DL
RBC # BLD: 2.4 MILL/MM3 (ref 4.7–6.1)
RBC # BLD: 2.91 MILL/MM3 (ref 4.7–6.1)
RBC URINE: ABNORMAL /HPF
RENAL EPITHELIAL, UA: ABNORMAL
RH FACTOR: NORMAL
SEG NEUTROPHILS: 86.8 %
SEGMENTED NEUTROPHILS ABSOLUTE COUNT: 9.7 THOU/MM3 (ref 1.8–7.7)
SODIUM BLD-SCNC: 140 MEQ/L (ref 135–145)
SODIUM BLD-SCNC: 141 MEQ/L (ref 135–145)
SPECIFIC GRAVITY UA: 1.02 (ref 1–1.03)
UROBILINOGEN, URINE: 1 EU/DL
WBC # BLD: 11.2 THOU/MM3 (ref 4.8–10.8)
WBC # BLD: 6.5 THOU/MM3 (ref 4.8–10.8)
WBC UA: ABNORMAL /HPF
YEAST: ABNORMAL

## 2018-10-11 PROCEDURE — C1713 ANCHOR/SCREW BN/BN,TIS/BN: HCPCS | Performed by: ORTHOPAEDIC SURGERY

## 2018-10-11 PROCEDURE — 1200000003 HC TELEMETRY R&B

## 2018-10-11 PROCEDURE — 3600000014 HC SURGERY LEVEL 4 ADDTL 15MIN: Performed by: ORTHOPAEDIC SURGERY

## 2018-10-11 PROCEDURE — 2580000003 HC RX 258: Performed by: NURSE ANESTHETIST, CERTIFIED REGISTERED

## 2018-10-11 PROCEDURE — P9016 RBC LEUKOCYTES REDUCED: HCPCS

## 2018-10-11 PROCEDURE — 73552 X-RAY EXAM OF FEMUR 2/>: CPT

## 2018-10-11 PROCEDURE — 36430 TRANSFUSION BLD/BLD COMPNT: CPT

## 2018-10-11 PROCEDURE — 6360000002 HC RX W HCPCS: Performed by: NURSE ANESTHETIST, CERTIFIED REGISTERED

## 2018-10-11 PROCEDURE — 3700000000 HC ANESTHESIA ATTENDED CARE: Performed by: ORTHOPAEDIC SURGERY

## 2018-10-11 PROCEDURE — 7100000000 HC PACU RECOVERY - FIRST 15 MIN: Performed by: ORTHOPAEDIC SURGERY

## 2018-10-11 PROCEDURE — 85610 PROTHROMBIN TIME: CPT

## 2018-10-11 PROCEDURE — 2709999900 HC NON-CHARGEABLE SUPPLY: Performed by: ORTHOPAEDIC SURGERY

## 2018-10-11 PROCEDURE — 1200000000 HC SEMI PRIVATE

## 2018-10-11 PROCEDURE — C1776 JOINT DEVICE (IMPLANTABLE): HCPCS | Performed by: ORTHOPAEDIC SURGERY

## 2018-10-11 PROCEDURE — 6360000002 HC RX W HCPCS: Performed by: NURSE PRACTITIONER

## 2018-10-11 PROCEDURE — 94760 N-INVAS EAR/PLS OXIMETRY 1: CPT

## 2018-10-11 PROCEDURE — 2700000000 HC OXYGEN THERAPY PER DAY

## 2018-10-11 PROCEDURE — 2709999900 HC NON-CHARGEABLE SUPPLY

## 2018-10-11 PROCEDURE — 86900 BLOOD TYPING SEROLOGIC ABO: CPT

## 2018-10-11 PROCEDURE — 6370000000 HC RX 637 (ALT 250 FOR IP): Performed by: PHYSICIAN ASSISTANT

## 2018-10-11 PROCEDURE — 2500000003 HC RX 250 WO HCPCS: Performed by: ORTHOPAEDIC SURGERY

## 2018-10-11 PROCEDURE — 85018 HEMOGLOBIN: CPT

## 2018-10-11 PROCEDURE — 7100000001 HC PACU RECOVERY - ADDTL 15 MIN: Performed by: ORTHOPAEDIC SURGERY

## 2018-10-11 PROCEDURE — 0QS606Z REPOSITION RIGHT UPPER FEMUR WITH INTRAMEDULLARY INTERNAL FIXATION DEVICE, OPEN APPROACH: ICD-10-PCS | Performed by: ORTHOPAEDIC SURGERY

## 2018-10-11 PROCEDURE — 86923 COMPATIBILITY TEST ELECTRIC: CPT

## 2018-10-11 PROCEDURE — 3600000004 HC SURGERY LEVEL 4 BASE: Performed by: ORTHOPAEDIC SURGERY

## 2018-10-11 PROCEDURE — 99233 SBSQ HOSP IP/OBS HIGH 50: CPT | Performed by: HOSPITALIST

## 2018-10-11 PROCEDURE — 3209999900 FLUORO FOR SURGICAL PROCEDURES

## 2018-10-11 PROCEDURE — 85027 COMPLETE CBC AUTOMATED: CPT

## 2018-10-11 PROCEDURE — 2720000010 HC SURG SUPPLY STERILE: Performed by: ORTHOPAEDIC SURGERY

## 2018-10-11 PROCEDURE — 86850 RBC ANTIBODY SCREEN: CPT

## 2018-10-11 PROCEDURE — 80048 BASIC METABOLIC PNL TOTAL CA: CPT

## 2018-10-11 PROCEDURE — P9045 ALBUMIN (HUMAN), 5%, 250 ML: HCPCS | Performed by: NURSE ANESTHETIST, CERTIFIED REGISTERED

## 2018-10-11 PROCEDURE — 6360000002 HC RX W HCPCS: Performed by: ORTHOPAEDIC SURGERY

## 2018-10-11 PROCEDURE — 2580000003 HC RX 258: Performed by: NURSE PRACTITIONER

## 2018-10-11 PROCEDURE — 86901 BLOOD TYPING SEROLOGIC RH(D): CPT

## 2018-10-11 PROCEDURE — 85014 HEMATOCRIT: CPT

## 2018-10-11 PROCEDURE — 36415 COLL VENOUS BLD VENIPUNCTURE: CPT

## 2018-10-11 PROCEDURE — 6370000000 HC RX 637 (ALT 250 FOR IP): Performed by: NURSE PRACTITIONER

## 2018-10-11 PROCEDURE — 2500000003 HC RX 250 WO HCPCS: Performed by: NURSE ANESTHETIST, CERTIFIED REGISTERED

## 2018-10-11 PROCEDURE — 2580000003 HC RX 258: Performed by: HOSPITALIST

## 2018-10-11 PROCEDURE — 3700000001 HC ADD 15 MINUTES (ANESTHESIA): Performed by: ORTHOPAEDIC SURGERY

## 2018-10-11 PROCEDURE — 85025 COMPLETE CBC W/AUTO DIFF WBC: CPT

## 2018-10-11 DEVICE — INTERTAN LAG/COMPRESSION SCREW KIT                                    100MM / 95MM
Type: IMPLANTABLE DEVICE | Site: HIP | Status: FUNCTIONAL
Brand: TRIGEN

## 2018-10-11 DEVICE — TRIGEN LOW PROFILE SCREW 5.0MM X 42.5MM
Type: IMPLANTABLE DEVICE | Site: HIP | Status: FUNCTIONAL
Brand: TRIGEN

## 2018-10-11 DEVICE — TRIGEN INTERTAN 10S 10MM X 38CM 125DEGREE RIGHT
Type: IMPLANTABLE DEVICE | Site: HIP | Status: FUNCTIONAL
Brand: TRIGEN

## 2018-10-11 DEVICE — ACCORD 2.0MM COBALT CHROME CABLE WITH CLAMP
Type: IMPLANTABLE DEVICE | Site: HIP | Status: FUNCTIONAL
Brand: ACCORD

## 2018-10-11 RX ORDER — FENTANYL CITRATE 50 UG/ML
50 INJECTION, SOLUTION INTRAMUSCULAR; INTRAVENOUS EVERY 5 MIN PRN
Status: DISCONTINUED | OUTPATIENT
Start: 2018-10-11 | End: 2018-10-11 | Stop reason: HOSPADM

## 2018-10-11 RX ORDER — EPHEDRINE SULFATE 50 MG/ML
INJECTION INTRAVENOUS PRN
Status: DISCONTINUED | OUTPATIENT
Start: 2018-10-11 | End: 2018-10-11 | Stop reason: SDUPTHER

## 2018-10-11 RX ORDER — HYDRALAZINE HYDROCHLORIDE 20 MG/ML
5 INJECTION INTRAMUSCULAR; INTRAVENOUS EVERY 10 MIN PRN
Status: DISCONTINUED | OUTPATIENT
Start: 2018-10-11 | End: 2018-10-11 | Stop reason: HOSPADM

## 2018-10-11 RX ORDER — HYDROCODONE BITARTRATE AND ACETAMINOPHEN 5; 325 MG/1; MG/1
2 TABLET ORAL EVERY 4 HOURS PRN
Status: DISCONTINUED | OUTPATIENT
Start: 2018-10-11 | End: 2018-10-17 | Stop reason: HOSPADM

## 2018-10-11 RX ORDER — ASPIRIN 325 MG
325 TABLET ORAL DAILY
Status: DISCONTINUED | OUTPATIENT
Start: 2018-10-12 | End: 2018-10-11 | Stop reason: ALTCHOICE

## 2018-10-11 RX ORDER — LABETALOL HYDROCHLORIDE 5 MG/ML
5 INJECTION, SOLUTION INTRAVENOUS EVERY 10 MIN PRN
Status: DISCONTINUED | OUTPATIENT
Start: 2018-10-11 | End: 2018-10-11 | Stop reason: HOSPADM

## 2018-10-11 RX ORDER — SODIUM CHLORIDE 9 MG/ML
INJECTION, SOLUTION INTRAVENOUS CONTINUOUS PRN
Status: DISCONTINUED | OUTPATIENT
Start: 2018-10-11 | End: 2018-10-11 | Stop reason: SDUPTHER

## 2018-10-11 RX ORDER — FENTANYL CITRATE 50 UG/ML
25 INJECTION, SOLUTION INTRAMUSCULAR; INTRAVENOUS EVERY 5 MIN PRN
Status: DISCONTINUED | OUTPATIENT
Start: 2018-10-11 | End: 2018-10-11 | Stop reason: HOSPADM

## 2018-10-11 RX ORDER — ONDANSETRON 2 MG/ML
4 INJECTION INTRAMUSCULAR; INTRAVENOUS
Status: DISCONTINUED | OUTPATIENT
Start: 2018-10-11 | End: 2018-10-11 | Stop reason: HOSPADM

## 2018-10-11 RX ORDER — PROPOFOL 10 MG/ML
INJECTION, EMULSION INTRAVENOUS PRN
Status: DISCONTINUED | OUTPATIENT
Start: 2018-10-11 | End: 2018-10-11

## 2018-10-11 RX ORDER — MEPERIDINE HYDROCHLORIDE 25 MG/ML
12.5 INJECTION INTRAMUSCULAR; INTRAVENOUS; SUBCUTANEOUS EVERY 5 MIN PRN
Status: DISCONTINUED | OUTPATIENT
Start: 2018-10-11 | End: 2018-10-11 | Stop reason: HOSPADM

## 2018-10-11 RX ORDER — DOCUSATE SODIUM 100 MG/1
100 CAPSULE, LIQUID FILLED ORAL DAILY
Status: DISCONTINUED | OUTPATIENT
Start: 2018-10-11 | End: 2018-10-17 | Stop reason: HOSPADM

## 2018-10-11 RX ORDER — HYDROCODONE BITARTRATE AND ACETAMINOPHEN 5; 325 MG/1; MG/1
1-2 TABLET ORAL
Qty: 50 TABLET | Refills: 0 | Status: SHIPPED | OUTPATIENT
Start: 2018-10-11 | End: 2018-10-18

## 2018-10-11 RX ORDER — CEFAZOLIN SODIUM 1 G/50ML
1 INJECTION, SOLUTION INTRAVENOUS EVERY 8 HOURS
Status: COMPLETED | OUTPATIENT
Start: 2018-10-11 | End: 2018-10-12

## 2018-10-11 RX ORDER — PROMETHAZINE HYDROCHLORIDE 25 MG/ML
6.25 INJECTION, SOLUTION INTRAMUSCULAR; INTRAVENOUS
Status: DISCONTINUED | OUTPATIENT
Start: 2018-10-11 | End: 2018-10-11 | Stop reason: HOSPADM

## 2018-10-11 RX ORDER — PROPOFOL 10 MG/ML
INJECTION, EMULSION INTRAVENOUS PRN
Status: DISCONTINUED | OUTPATIENT
Start: 2018-10-11 | End: 2018-10-11 | Stop reason: SDUPTHER

## 2018-10-11 RX ORDER — TRANEXAMIC ACID 100 MG/ML
INJECTION, SOLUTION INTRAVENOUS PRN
Status: DISCONTINUED | OUTPATIENT
Start: 2018-10-11 | End: 2018-10-11 | Stop reason: HOSPADM

## 2018-10-11 RX ORDER — ALBUMIN, HUMAN INJ 5% 5 %
SOLUTION INTRAVENOUS PRN
Status: DISCONTINUED | OUTPATIENT
Start: 2018-10-11 | End: 2018-10-11 | Stop reason: SDUPTHER

## 2018-10-11 RX ORDER — 0.9 % SODIUM CHLORIDE 0.9 %
250 INTRAVENOUS SOLUTION INTRAVENOUS ONCE
Status: COMPLETED | OUTPATIENT
Start: 2018-10-11 | End: 2018-10-12

## 2018-10-11 RX ORDER — LIDOCAINE HYDROCHLORIDE 20 MG/ML
INJECTION, SOLUTION INFILTRATION; PERINEURAL PRN
Status: DISCONTINUED | OUTPATIENT
Start: 2018-10-11 | End: 2018-10-11 | Stop reason: SDUPTHER

## 2018-10-11 RX ORDER — 0.9 % SODIUM CHLORIDE 0.9 %
500 INTRAVENOUS SOLUTION INTRAVENOUS ONCE
Status: COMPLETED | OUTPATIENT
Start: 2018-10-11 | End: 2018-10-11

## 2018-10-11 RX ORDER — ACETAMINOPHEN 325 MG/1
650 TABLET ORAL EVERY 4 HOURS PRN
Status: DISCONTINUED | OUTPATIENT
Start: 2018-10-11 | End: 2018-10-17 | Stop reason: HOSPADM

## 2018-10-11 RX ORDER — SODIUM CHLORIDE 9 MG/ML
INJECTION, SOLUTION INTRAVENOUS CONTINUOUS
Status: DISCONTINUED | OUTPATIENT
Start: 2018-10-11 | End: 2018-10-14

## 2018-10-11 RX ORDER — HYDROCODONE BITARTRATE AND ACETAMINOPHEN 5; 325 MG/1; MG/1
1 TABLET ORAL EVERY 4 HOURS PRN
Status: DISCONTINUED | OUTPATIENT
Start: 2018-10-11 | End: 2018-10-17 | Stop reason: HOSPADM

## 2018-10-11 RX ORDER — FENTANYL CITRATE 50 UG/ML
INJECTION, SOLUTION INTRAMUSCULAR; INTRAVENOUS PRN
Status: DISCONTINUED | OUTPATIENT
Start: 2018-10-11 | End: 2018-10-11 | Stop reason: SDUPTHER

## 2018-10-11 RX ORDER — ROCURONIUM BROMIDE 10 MG/ML
INJECTION, SOLUTION INTRAVENOUS PRN
Status: DISCONTINUED | OUTPATIENT
Start: 2018-10-11 | End: 2018-10-11 | Stop reason: SDUPTHER

## 2018-10-11 RX ORDER — ONDANSETRON 2 MG/ML
INJECTION INTRAMUSCULAR; INTRAVENOUS PRN
Status: DISCONTINUED | OUTPATIENT
Start: 2018-10-11 | End: 2018-10-11 | Stop reason: SDUPTHER

## 2018-10-11 RX ORDER — CLOPIDOGREL BISULFATE 75 MG/1
75 TABLET ORAL DAILY
Status: DISCONTINUED | OUTPATIENT
Start: 2018-10-12 | End: 2018-10-12

## 2018-10-11 RX ADMIN — PHENYLEPHRINE HYDROCHLORIDE 100 MCG: 10 INJECTION INTRAVENOUS at 12:35

## 2018-10-11 RX ADMIN — ROCURONIUM BROMIDE 30 MG: 10 INJECTION INTRAVENOUS at 11:33

## 2018-10-11 RX ADMIN — SODIUM CHLORIDE: 9 INJECTION, SOLUTION INTRAVENOUS at 11:26

## 2018-10-11 RX ADMIN — FENTANYL CITRATE 50 MCG: 50 INJECTION INTRAMUSCULAR; INTRAVENOUS at 13:10

## 2018-10-11 RX ADMIN — SODIUM CHLORIDE 250 ML: 9 INJECTION, SOLUTION INTRAVENOUS at 22:13

## 2018-10-11 RX ADMIN — PHENYLEPHRINE HYDROCHLORIDE 100 MCG: 10 INJECTION INTRAVENOUS at 12:30

## 2018-10-11 RX ADMIN — DEXTROSE MONOHYDRATE 2 G: 50 INJECTION, SOLUTION INTRAVENOUS at 11:41

## 2018-10-11 RX ADMIN — FENTANYL CITRATE 25 MCG: 50 INJECTION INTRAMUSCULAR; INTRAVENOUS at 12:25

## 2018-10-11 RX ADMIN — EPHEDRINE SULFATE 5 MG: 50 INJECTION, SOLUTION INTRAVENOUS at 11:39

## 2018-10-11 RX ADMIN — DOCUSATE SODIUM 100 MG: 100 CAPSULE, LIQUID FILLED ORAL at 21:42

## 2018-10-11 RX ADMIN — CEFAZOLIN SODIUM 1 G: 1 INJECTION, SOLUTION INTRAVENOUS at 21:38

## 2018-10-11 RX ADMIN — SUGAMMADEX 200 MG: 100 INJECTION, SOLUTION INTRAVENOUS at 13:01

## 2018-10-11 RX ADMIN — ALBUMIN (HUMAN) 250 ML: 12.5 SOLUTION INTRAVENOUS at 11:50

## 2018-10-11 RX ADMIN — EPHEDRINE SULFATE 5 MG: 50 INJECTION, SOLUTION INTRAVENOUS at 11:43

## 2018-10-11 RX ADMIN — FENTANYL CITRATE 25 MCG: 50 INJECTION INTRAMUSCULAR; INTRAVENOUS at 11:55

## 2018-10-11 RX ADMIN — ONDANSETRON HYDROCHLORIDE 4 MG: 4 INJECTION, SOLUTION INTRAMUSCULAR; INTRAVENOUS at 13:10

## 2018-10-11 RX ADMIN — LIDOCAINE HYDROCHLORIDE 40 MG: 20 INJECTION, SOLUTION INFILTRATION; PERINEURAL at 11:33

## 2018-10-11 RX ADMIN — SODIUM CHLORIDE 500 ML: 9 INJECTION, SOLUTION INTRAVENOUS at 02:45

## 2018-10-11 RX ADMIN — ALBUMIN (HUMAN) 250 ML: 12.5 SOLUTION INTRAVENOUS at 12:20

## 2018-10-11 RX ADMIN — PROPOFOL 80 MG: 10 INJECTION, EMULSION INTRAVENOUS at 11:33

## 2018-10-11 RX ADMIN — EPHEDRINE SULFATE 10 MG: 50 INJECTION, SOLUTION INTRAVENOUS at 11:36

## 2018-10-11 RX ADMIN — POTASSIUM CHLORIDE 10 MEQ: 750 TABLET, FILM COATED, EXTENDED RELEASE ORAL at 21:40

## 2018-10-11 RX ADMIN — SODIUM CHLORIDE: 9 INJECTION, SOLUTION INTRAVENOUS at 12:00

## 2018-10-11 RX ADMIN — PHENYLEPHRINE HYDROCHLORIDE 100 MCG: 10 INJECTION INTRAVENOUS at 12:45

## 2018-10-11 ASSESSMENT — PULMONARY FUNCTION TESTS
PIF_VALUE: 19
PIF_VALUE: 21
PIF_VALUE: 20
PIF_VALUE: 21
PIF_VALUE: 19
PIF_VALUE: 20
PIF_VALUE: 19
PIF_VALUE: 0
PIF_VALUE: 18
PIF_VALUE: 19
PIF_VALUE: 18
PIF_VALUE: 18
PIF_VALUE: 20
PIF_VALUE: 1
PIF_VALUE: 19
PIF_VALUE: 2
PIF_VALUE: 22
PIF_VALUE: 19
PIF_VALUE: 24
PIF_VALUE: 19
PIF_VALUE: 19
PIF_VALUE: 3
PIF_VALUE: 18
PIF_VALUE: 19
PIF_VALUE: 21
PIF_VALUE: 17
PIF_VALUE: 19
PIF_VALUE: 19
PIF_VALUE: 26
PIF_VALUE: 18
PIF_VALUE: 23
PIF_VALUE: 19
PIF_VALUE: 18
PIF_VALUE: 17
PIF_VALUE: 19
PIF_VALUE: 18
PIF_VALUE: 23
PIF_VALUE: 17
PIF_VALUE: 20
PIF_VALUE: 18
PIF_VALUE: 22
PIF_VALUE: 19
PIF_VALUE: 18
PIF_VALUE: 18
PIF_VALUE: 19
PIF_VALUE: 18
PIF_VALUE: 18
PIF_VALUE: 23
PIF_VALUE: 17
PIF_VALUE: 18
PIF_VALUE: 19
PIF_VALUE: 19
PIF_VALUE: 18
PIF_VALUE: 21
PIF_VALUE: 19
PIF_VALUE: 23
PIF_VALUE: 0
PIF_VALUE: 24
PIF_VALUE: 19
PIF_VALUE: 1
PIF_VALUE: 18
PIF_VALUE: 18
PIF_VALUE: 0
PIF_VALUE: 18
PIF_VALUE: 20
PIF_VALUE: 19
PIF_VALUE: 23
PIF_VALUE: 20
PIF_VALUE: 18
PIF_VALUE: 25
PIF_VALUE: 18
PIF_VALUE: 18
PIF_VALUE: 21
PIF_VALUE: 11
PIF_VALUE: 1
PIF_VALUE: 1
PIF_VALUE: 19
PIF_VALUE: 11
PIF_VALUE: 0
PIF_VALUE: 20
PIF_VALUE: 19
PIF_VALUE: 2
PIF_VALUE: 21
PIF_VALUE: 19
PIF_VALUE: 22
PIF_VALUE: 18
PIF_VALUE: 18
PIF_VALUE: 20
PIF_VALUE: 23
PIF_VALUE: 0
PIF_VALUE: 17
PIF_VALUE: 21
PIF_VALUE: 18
PIF_VALUE: 23
PIF_VALUE: 18
PIF_VALUE: 20
PIF_VALUE: 19

## 2018-10-11 ASSESSMENT — PAIN SCALES - GENERAL
PAINLEVEL_OUTOF10: 6
PAINLEVEL_OUTOF10: 6
PAINLEVEL_OUTOF10: 2
PAINLEVEL_OUTOF10: 6
PAINLEVEL_OUTOF10: 0
PAINLEVEL_OUTOF10: 6
PAINLEVEL_OUTOF10: 2

## 2018-10-11 ASSESSMENT — ACTIVITIES OF DAILY LIVING (ADL): EFFECT OF PAIN ON DAILY ACTIVITIES: DIFFICULTY MOVING

## 2018-10-11 ASSESSMENT — PAIN DESCRIPTION - ORIENTATION
ORIENTATION: RIGHT
ORIENTATION: RIGHT

## 2018-10-11 ASSESSMENT — PAIN DESCRIPTION - DESCRIPTORS: DESCRIPTORS: ACHING

## 2018-10-11 ASSESSMENT — PAIN DESCRIPTION - PAIN TYPE
TYPE: ACUTE PAIN
TYPE: ACUTE PAIN

## 2018-10-11 ASSESSMENT — PAIN DESCRIPTION - LOCATION
LOCATION: HIP
LOCATION: HIP

## 2018-10-11 ASSESSMENT — PAIN - FUNCTIONAL ASSESSMENT: PAIN_FUNCTIONAL_ASSESSMENT: 0-10

## 2018-10-11 NOTE — H&P
History and Physical Update    Pt Name: Bertram Boyle  MRN: [id number]  YOB: 1940  Date of evaluation: 10/11/2018    [x] I have examined the patient and reviewed the H&P/Consult and there are no changes to the patient or plans.     [] I have examined the patient and reviewed the H&P/Consult and have noted the following changes:        MILES Cabrera CNP   Electronically signed 10/11/2018 at 8:32 AM

## 2018-10-11 NOTE — ED PROVIDER NOTES
METABOLIC PANEL - Abnormal; Notable for the following:     Glucose 119 (*)     BUN 43 (*)     CREATININE 2.7 (*)     Calcium 8.3 (*)     All other components within normal limits   HEMOGLOBIN AND HEMATOCRIT, BLOOD - Abnormal; Notable for the following:     Hemoglobin 10.0 (*)     Hematocrit 29.8 (*)     All other components within normal limits   GLOMERULAR FILTRATION RATE, ESTIMATED - Abnormal; Notable for the following:     Est, Glom Filt Rate 23 (*)     All other components within normal limits   HEMOGLOBIN AND HEMATOCRIT, BLOOD - Abnormal; Notable for the following:     Hemoglobin 9.0 (*)     Hematocrit 26.7 (*)     All other components within normal limits   BASIC METABOLIC PANEL - Abnormal; Notable for the following:     BUN 46 (*)     CREATININE 2.7 (*)     Calcium 8.2 (*)     All other components within normal limits   GLOMERULAR FILTRATION RATE, ESTIMATED - Abnormal; Notable for the following:     Est, Glom Filt Rate 23 (*)     All other components within normal limits   BASIC METABOLIC PANEL - Abnormal; Notable for the following:     BUN 45 (*)     CREATININE 2.0 (*)     Calcium 8.3 (*)     All other components within normal limits   CBC - Abnormal; Notable for the following:     RBC 2.91 (*)     Hemoglobin 8.9 (*)     Hematocrit 27.1 (*)     RDW-CV 16.6 (*)     RDW-SD 56.2 (*)     Platelets 59 (*)     All other components within normal limits   URINALYSIS WITH MICROSCOPIC - Abnormal; Notable for the following:     Bilirubin Urine SMALL (*)     Protein, UA TRACE (*)     All other components within normal limits   HEMOGLOBIN AND HEMATOCRIT, BLOOD - Abnormal; Notable for the following:     Hemoglobin 9.0 (*)     Hematocrit 26.3 (*)     All other components within normal limits   HEMOGLOBIN AND HEMATOCRIT, BLOOD - Abnormal; Notable for the following:     Hemoglobin 7.4 (*)     Hematocrit 22.4 (*)     All other components within normal limits   GLOMERULAR FILTRATION RATE, ESTIMATED - Abnormal; Notable for the following:     Est, Glom Filt Rate 32 (*)     All other components within normal limits   HEMOGLOBIN AND HEMATOCRIT, BLOOD - Abnormal; Notable for the following:     Hemoglobin 8.3 (*)     Hematocrit 24.6 (*)     All other components within normal limits   CBC WITH AUTO DIFFERENTIAL - Abnormal; Notable for the following:     WBC 11.2 (*)     RBC 2.40 (*)     Hemoglobin 7.4 (*)     Hematocrit 21.9 (*)     RDW-CV 16.6 (*)     RDW-SD 55.2 (*)     Platelets 77 (*)     MPV 9.2 (*)     Segs Absolute 9.7 (*)     Lymphocytes # 0.4 (*)     All other components within normal limits   SCAN OF BLOOD SMEAR   ANION GAP   OSMOLALITY   ANION GAP   ANION GAP   PROTIME-INR   SODIUM, URINE, RANDOM   CREATININE, RANDOM URINE   ICTOTEST, URINE   ANION GAP   HEMOGLOBIN AND HEMATOCRIT, BLOOD   TYPE AND SCREEN       EMERGENCY DEPARTMENT COURSE:   Vitals:    Vitals:    10/11/18 1520 10/11/18 1535 10/11/18 1605 10/11/18 1620   BP: (!) 87/52 (!) 85/48 (!) 96/51 (!) 90/51   Pulse: 76 74 74 83   Resp:       Temp:       TempSrc:       SpO2: 95%  96% 91%         CRITICAL CARE:     CONSULTS:  None    PROCEDURES:  None    FINAL IMPRESSION      1. Right leg pain    2. Hip fracture requiring operative repair, right, closed, initial encounter [facility])          DISPOSITION/PLAN   Admitted    PATIENT REFERRED TO:  Louis Wiley, [address] [place], [place] [id number] [phone]            DISCHARGE MEDICATIONS:  Current Discharge Medication List      START taking these medications    Details   HYDROcodone-acetaminophen (NORCO) 5-325 MG per tablet Take 1-2 tablets by mouth every 4-6 hours as needed for Pain for up to 7 days. Bernestine Presto: 50 tablet, Refills: 0    Associated Diagnoses: Hip fracture requiring operative repair, right, closed, [facility]      enoxaparin (LOVENOX) 40 MG/0.4ML injection Inject 0.4 mLs into the skin daily  Qty: 18 Syringe, Refills: 0    Associated Diagnoses: Hip fracture requiring operative repair, right, closed,

## 2018-10-11 NOTE — PROGRESS NOTES
This RN was notified from [facility] on floor that patient wanted to speak with RN or someone higher up and was agitated. This RN went and spoke with patient and asked what his concerns were. Patient voiced that he was upset that he hasn't had surgery yet and that his surgery isn't until [phone] today. Patient also stated he was upset that he hasn't had anything to eat since Tuesday. This RN voiced to patient that she was unaware that patient did not have any food throughout the day on Wednesday. Patient was NPO and per doctor's note stated was on call for surgery. Explained to patient that they might have kept him NPO incase his BP improved and was able to fit him into surgery. Patient was offered mouth swabs throughout shift. Patient ended up not having surgery Wednesday due to patient's BP low. Explained to patient why his surgery was pushed back to the following day. Patient voiced understanding. Patient still seemed agitated. This RN passed report on to dayshift, let charge RN know, as well as manager. Patient's wife at bedside as well.

## 2018-10-12 LAB
ANION GAP SERPL CALCULATED.3IONS-SCNC: 10 MEQ/L (ref 8–16)
ANION GAP SERPL CALCULATED.3IONS-SCNC: 11 MEQ/L (ref 8–16)
BASOPHILIA: ABNORMAL
BASOPHILS # BLD: 0.2 %
BASOPHILS ABSOLUTE: 0 THOU/MM3 (ref 0–0.1)
BUN BLDV-MCNC: 44 MG/DL (ref 7–22)
BUN BLDV-MCNC: 46 MG/DL (ref 7–22)
CALCIUM SERPL-MCNC: 7.8 MG/DL (ref 8.5–10.5)
CALCIUM SERPL-MCNC: 7.9 MG/DL (ref 8.5–10.5)
CHLORIDE BLD-SCNC: 107 MEQ/L (ref 98–111)
CHLORIDE BLD-SCNC: 107 MEQ/L (ref 98–111)
CO2: 23 MEQ/L (ref 23–33)
CO2: 24 MEQ/L (ref 23–33)
CREAT SERPL-MCNC: 1.8 MG/DL (ref 0.4–1.2)
CREAT SERPL-MCNC: 2 MG/DL (ref 0.4–1.2)
EOSINOPHIL # BLD: 0.2 %
EOSINOPHILS ABSOLUTE: 0 THOU/MM3 (ref 0–0.4)
ERYTHROCYTE [DISTWIDTH] IN BLOOD BY AUTOMATED COUNT: 16.4 % (ref 11.5–14.5)
ERYTHROCYTE [DISTWIDTH] IN BLOOD BY AUTOMATED COUNT: 53.1 FL (ref 35–45)
GFR SERPL CREATININE-BSD FRML MDRD: 32 ML/MIN/1.73M2
GFR SERPL CREATININE-BSD FRML MDRD: 36 ML/MIN/1.73M2
GLUCOSE BLD-MCNC: 121 MG/DL (ref 70–108)
GLUCOSE BLD-MCNC: 125 MG/DL (ref 70–108)
HCT VFR BLD CALC: 19.9 % (ref 42–52)
HCT VFR BLD CALC: 22.6 % (ref 42–52)
HCT VFR BLD CALC: 23.5 % (ref 42–52)
HCT VFR BLD CALC: 24 % (ref 42–52)
HEMOGLOBIN: 6.7 GM/DL (ref 14–18)
HEMOGLOBIN: 7.7 GM/DL (ref 14–18)
HEMOGLOBIN: 7.9 GM/DL (ref 14–18)
HEMOGLOBIN: 8 GM/DL (ref 14–18)
IMMATURE GRANS (ABS): 0.03 THOU/MM3 (ref 0–0.07)
IMMATURE GRANULOCYTES: 0.5 %
LYMPHOCYTES # BLD: 4.3 %
LYMPHOCYTES ABSOLUTE: 0.3 THOU/MM3 (ref 1–4.8)
MCH RBC QN AUTO: 30.2 PG (ref 26–33)
MCHC RBC AUTO-ENTMCNC: 33.6 GM/DL (ref 32.2–35.5)
MCV RBC AUTO: 89.7 FL (ref 80–94)
MONOCYTES # BLD: 8.6 %
MONOCYTES ABSOLUTE: 0.5 THOU/MM3 (ref 0.4–1.3)
NUCLEATED RED BLOOD CELLS: 0 /100 WBC
PLATELET # BLD: 54 THOU/MM3 (ref 130–400)
PLATELET ESTIMATE: ABNORMAL
PMV BLD AUTO: 9.5 FL (ref 9.4–12.4)
POTASSIUM SERPL-SCNC: 4.2 MEQ/L (ref 3.5–5.2)
POTASSIUM SERPL-SCNC: 5 MEQ/L (ref 3.5–5.2)
RBC # BLD: 2.62 MILL/MM3 (ref 4.7–6.1)
SCAN OF BLOOD SMEAR: NORMAL
SEG NEUTROPHILS: 86.2 %
SEGMENTED NEUTROPHILS ABSOLUTE COUNT: 5.3 THOU/MM3 (ref 1.8–7.7)
SODIUM BLD-SCNC: 141 MEQ/L (ref 135–145)
SODIUM BLD-SCNC: 141 MEQ/L (ref 135–145)
WBC # BLD: 6.1 THOU/MM3 (ref 4.8–10.8)

## 2018-10-12 PROCEDURE — 6370000000 HC RX 637 (ALT 250 FOR IP): Performed by: NURSE PRACTITIONER

## 2018-10-12 PROCEDURE — 1200000003 HC TELEMETRY R&B

## 2018-10-12 PROCEDURE — 85025 COMPLETE CBC W/AUTO DIFF WBC: CPT

## 2018-10-12 PROCEDURE — 36415 COLL VENOUS BLD VENIPUNCTURE: CPT

## 2018-10-12 PROCEDURE — 6360000002 HC RX W HCPCS: Performed by: NURSE PRACTITIONER

## 2018-10-12 PROCEDURE — 85014 HEMATOCRIT: CPT

## 2018-10-12 PROCEDURE — 99233 SBSQ HOSP IP/OBS HIGH 50: CPT | Performed by: HOSPITALIST

## 2018-10-12 PROCEDURE — 6370000000 HC RX 637 (ALT 250 FOR IP): Performed by: PHYSICIAN ASSISTANT

## 2018-10-12 PROCEDURE — 2580000003 HC RX 258: Performed by: FAMILY MEDICINE

## 2018-10-12 PROCEDURE — 36430 TRANSFUSION BLD/BLD COMPNT: CPT

## 2018-10-12 PROCEDURE — 80048 BASIC METABOLIC PNL TOTAL CA: CPT

## 2018-10-12 PROCEDURE — P9016 RBC LEUKOCYTES REDUCED: HCPCS

## 2018-10-12 PROCEDURE — 85018 HEMOGLOBIN: CPT

## 2018-10-12 RX ORDER — 0.9 % SODIUM CHLORIDE 0.9 %
250 INTRAVENOUS SOLUTION INTRAVENOUS ONCE
Status: COMPLETED | OUTPATIENT
Start: 2018-10-12 | End: 2018-10-12

## 2018-10-12 RX ORDER — LORAZEPAM 2 MG/ML
0.5 INJECTION INTRAMUSCULAR EVERY 12 HOURS PRN
Status: DISCONTINUED | OUTPATIENT
Start: 2018-10-12 | End: 2018-10-17 | Stop reason: HOSPADM

## 2018-10-12 RX ADMIN — POTASSIUM CHLORIDE 10 MEQ: 750 TABLET, FILM COATED, EXTENDED RELEASE ORAL at 09:59

## 2018-10-12 RX ADMIN — CEFAZOLIN SODIUM 1 G: 1 INJECTION, SOLUTION INTRAVENOUS at 05:46

## 2018-10-12 RX ADMIN — POTASSIUM CHLORIDE 10 MEQ: 750 TABLET, FILM COATED, EXTENDED RELEASE ORAL at 23:30

## 2018-10-12 RX ADMIN — SODIUM CHLORIDE 250 ML: 9 INJECTION, SOLUTION INTRAVENOUS at 07:00

## 2018-10-12 RX ADMIN — ACETAMINOPHEN 650 MG: 325 TABLET ORAL at 09:55

## 2018-10-12 RX ADMIN — ACETAMINOPHEN 650 MG: 325 TABLET ORAL at 04:28

## 2018-10-12 RX ADMIN — DOCUSATE SODIUM 100 MG: 100 CAPSULE, LIQUID FILLED ORAL at 09:55

## 2018-10-12 RX ADMIN — SERTRALINE 100 MG: 100 TABLET, FILM COATED ORAL at 09:56

## 2018-10-12 ASSESSMENT — PAIN SCALES - GENERAL
PAINLEVEL_OUTOF10: 6
PAINLEVEL_OUTOF10: 10
PAINLEVEL_OUTOF10: 0
PAINLEVEL_OUTOF10: 3
PAINLEVEL_OUTOF10: 5

## 2018-10-12 ASSESSMENT — PAIN DESCRIPTION - LOCATION
LOCATION: HIP
LOCATION: HIP

## 2018-10-12 ASSESSMENT — PAIN DESCRIPTION - PAIN TYPE
TYPE: SURGICAL PAIN
TYPE: SURGICAL PAIN

## 2018-10-12 ASSESSMENT — PAIN DESCRIPTION - ORIENTATION
ORIENTATION: RIGHT
ORIENTATION: RIGHT

## 2018-10-12 NOTE — OP NOTE
medial face of the greater trochanter and advanced  under fluoroscopic guidance. A 16 mm channel reamer was then passed  down the level of the lesser trochanter, placed a 10 x 380 mm InterTan  nail down the appropriate depth. Using the outrigging jig, a 3.2 mm  guide pin placed in the central femoral head. Used a dimpler followed  by the cannulated reamer, placed a 100-mm lag screw and a 95-mm  compression screw for proximal control. We just got our pieces in  pretty good position. We took two cables around the area to get the  fragments to lock in a little bit better. We got the rotation right,  we got get the length right. Once the two cables were passed, we then  placed one distal locking screw through a small stab wound in the  distal femur. X-rays demonstrated fracture in satisfactory alignment. Hardware in satisfactory position. Wounds all irrigated, placed the  Hemovac drain. The vastus lateralis fell back into position. The  iliotibial band with #2 Kar[place]. Skin was repaired with 0 Quill Prineo,  also Prineo at the distal hole. The patient was then awakened and  turned to recovery room in good condition. POSTOPERATIVE PLAN:  Weightbearing as tolerated. Dry dressings p.r.n. First postop visit will be in eight weeks; x-rays, two views of the  right femur at that time. Elida Hutson PA-C and Junie Steinberg. OFELIA Ruelas, assisted throughout  the procedure with positioning, draping, retraction, wound closure,  dressing, and splint application.         Mickey Almonte M.D.    D: 10/11/2018 13:45:57       T: 10/12/2018 1:23:20     [email or web address]  Job#: [id number]     Doc#: [id number]    CC:

## 2018-10-12 NOTE — CARE COORDINATION
Discharge Planning Update Note:  I spoke with wife and daughter. Planning [facility] at discharge. Rach Lama is getting blood now. [address] waiting for PT/OT to treat today - she will send therapy notes to ECF.

## 2018-10-12 NOTE — CARE COORDINATION
10/12/18, 3:41 PM    Discharge plan discussed by  and . Discharge plan reviewed with patient/ family. Patient/ family verbalize understanding of discharge plan and are in agreement with plan. Understanding was demonstrated using the teach back method. Services After Discharge  Services At/After Discharge: Skilled Therapy, OT, PT ([facility] skilled)   IMM Letter  IMM Letter given to Patient/Family/Significant other/Guardian/POA/by[de-identified] cm  IMM Letter date given[de-identified] 10/12/18  IMM Letter time given[de-identified] 1130     Patient is a potential discharge over the weekend. Spoke with Patient's spouse patient therapy was on hold this AM due to condition. She aware that if patient condition improves and he is medical ready he could go over the weekend to facility. Facility aware of discharge possibility. BESS Wild also aware that if medical ready patient could discharge to [facility] after Saturday AM.  She is aware that therapy notes will need faxed. Blue envelope, PASSR and instructions on the chart if patient becomes ready over the weekend.

## 2018-10-13 LAB
ANION GAP SERPL CALCULATED.3IONS-SCNC: 12 MEQ/L (ref 8–16)
BASOPHILS # BLD: 0.1 %
BASOPHILS ABSOLUTE: 0 THOU/MM3 (ref 0–0.1)
BUN BLDV-MCNC: 38 MG/DL (ref 7–22)
CALCIUM SERPL-MCNC: 8.1 MG/DL (ref 8.5–10.5)
CHLORIDE BLD-SCNC: 109 MEQ/L (ref 98–111)
CO2: 21 MEQ/L (ref 23–33)
CREAT SERPL-MCNC: 1.4 MG/DL (ref 0.4–1.2)
EOSINOPHIL # BLD: 0.1 %
EOSINOPHILS ABSOLUTE: 0 THOU/MM3 (ref 0–0.4)
ERYTHROCYTE [DISTWIDTH] IN BLOOD BY AUTOMATED COUNT: 16.8 % (ref 11.5–14.5)
ERYTHROCYTE [DISTWIDTH] IN BLOOD BY AUTOMATED COUNT: 54 FL (ref 35–45)
GFR SERPL CREATININE-BSD FRML MDRD: 49 ML/MIN/1.73M2
GLUCOSE BLD-MCNC: 118 MG/DL (ref 70–108)
HCT VFR BLD CALC: 23.1 % (ref 42–52)
HCT VFR BLD CALC: 23.2 % (ref 42–52)
HCT VFR BLD CALC: 25.9 % (ref 42–52)
HEMOGLOBIN: 7.8 GM/DL (ref 14–18)
HEMOGLOBIN: 7.8 GM/DL (ref 14–18)
HEMOGLOBIN: 8.5 GM/DL (ref 14–18)
IMMATURE GRANS (ABS): 0.05 THOU/MM3 (ref 0–0.07)
IMMATURE GRANULOCYTES: 0.7 %
LYMPHOCYTES # BLD: 3 %
LYMPHOCYTES ABSOLUTE: 0.2 THOU/MM3 (ref 1–4.8)
MCH RBC QN AUTO: 30 PG (ref 26–33)
MCHC RBC AUTO-ENTMCNC: 33.6 GM/DL (ref 32.2–35.5)
MCV RBC AUTO: 89.2 FL (ref 80–94)
MONOCYTES # BLD: 8.6 %
MONOCYTES ABSOLUTE: 0.6 THOU/MM3 (ref 0.4–1.3)
NUCLEATED RED BLOOD CELLS: 0 /100 WBC
PLATELET # BLD: 59 THOU/MM3 (ref 130–400)
PMV BLD AUTO: 9.1 FL (ref 9.4–12.4)
POTASSIUM SERPL-SCNC: 4.1 MEQ/L (ref 3.5–5.2)
RBC # BLD: 2.6 MILL/MM3 (ref 4.7–6.1)
SEG NEUTROPHILS: 87.5 %
SEGMENTED NEUTROPHILS ABSOLUTE COUNT: 6 THOU/MM3 (ref 1.8–7.7)
SODIUM BLD-SCNC: 142 MEQ/L (ref 135–145)
WBC # BLD: 6.8 THOU/MM3 (ref 4.8–10.8)

## 2018-10-13 PROCEDURE — 99233 SBSQ HOSP IP/OBS HIGH 50: CPT | Performed by: HOSPITALIST

## 2018-10-13 PROCEDURE — G8979 MOBILITY GOAL STATUS: HCPCS

## 2018-10-13 PROCEDURE — 85018 HEMOGLOBIN: CPT

## 2018-10-13 PROCEDURE — G8978 MOBILITY CURRENT STATUS: HCPCS

## 2018-10-13 PROCEDURE — 1200000003 HC TELEMETRY R&B

## 2018-10-13 PROCEDURE — 85025 COMPLETE CBC W/AUTO DIFF WBC: CPT

## 2018-10-13 PROCEDURE — 6370000000 HC RX 637 (ALT 250 FOR IP): Performed by: PHYSICIAN ASSISTANT

## 2018-10-13 PROCEDURE — 2580000003 HC RX 258: Performed by: HOSPITALIST

## 2018-10-13 PROCEDURE — 97167 OT EVAL HIGH COMPLEX 60 MIN: CPT

## 2018-10-13 PROCEDURE — 85014 HEMATOCRIT: CPT

## 2018-10-13 PROCEDURE — 97161 PT EVAL LOW COMPLEX 20 MIN: CPT

## 2018-10-13 PROCEDURE — 97530 THERAPEUTIC ACTIVITIES: CPT

## 2018-10-13 PROCEDURE — G8987 SELF CARE CURRENT STATUS: HCPCS

## 2018-10-13 PROCEDURE — 6370000000 HC RX 637 (ALT 250 FOR IP): Performed by: NURSE PRACTITIONER

## 2018-10-13 PROCEDURE — 97110 THERAPEUTIC EXERCISES: CPT

## 2018-10-13 PROCEDURE — G8988 SELF CARE GOAL STATUS: HCPCS

## 2018-10-13 PROCEDURE — 80048 BASIC METABOLIC PNL TOTAL CA: CPT

## 2018-10-13 PROCEDURE — 6360000002 HC RX W HCPCS: Performed by: HOSPITALIST

## 2018-10-13 PROCEDURE — 36415 COLL VENOUS BLD VENIPUNCTURE: CPT

## 2018-10-13 RX ORDER — SODIUM CHLORIDE 9 MG/ML
INJECTION, SOLUTION INTRAVENOUS CONTINUOUS
Status: DISCONTINUED | OUTPATIENT
Start: 2018-10-13 | End: 2018-10-14

## 2018-10-13 RX ORDER — METOPROLOL TARTRATE 100 MG/1
100 TABLET ORAL 2 TIMES DAILY
Status: DISCONTINUED | OUTPATIENT
Start: 2018-10-13 | End: 2018-10-17 | Stop reason: HOSPADM

## 2018-10-13 RX ADMIN — TIZANIDINE 4 MG: 4 TABLET ORAL at 21:37

## 2018-10-13 RX ADMIN — ACETAMINOPHEN 650 MG: 325 TABLET ORAL at 15:10

## 2018-10-13 RX ADMIN — LORAZEPAM 0.5 MG: 2 INJECTION INTRAMUSCULAR; INTRAVENOUS at 02:58

## 2018-10-13 RX ADMIN — ACETAMINOPHEN 650 MG: 325 TABLET ORAL at 08:32

## 2018-10-13 RX ADMIN — ENOXAPARIN SODIUM 40 MG: 40 INJECTION SUBCUTANEOUS at 15:10

## 2018-10-13 RX ADMIN — POTASSIUM CHLORIDE 10 MEQ: 750 TABLET, FILM COATED, EXTENDED RELEASE ORAL at 08:32

## 2018-10-13 RX ADMIN — SODIUM CHLORIDE: 9 INJECTION, SOLUTION INTRAVENOUS at 15:10

## 2018-10-13 RX ADMIN — SERTRALINE 100 MG: 100 TABLET, FILM COATED ORAL at 08:32

## 2018-10-13 RX ADMIN — DOCUSATE SODIUM 100 MG: 100 CAPSULE, LIQUID FILLED ORAL at 08:32

## 2018-10-13 RX ADMIN — POTASSIUM CHLORIDE 10 MEQ: 750 TABLET, FILM COATED, EXTENDED RELEASE ORAL at 21:37

## 2018-10-13 RX ADMIN — SODIUM CHLORIDE: 9 INJECTION, SOLUTION INTRAVENOUS at 05:05

## 2018-10-13 ASSESSMENT — PAIN DESCRIPTION - ORIENTATION: ORIENTATION: RIGHT

## 2018-10-13 ASSESSMENT — PAIN SCALES - GENERAL
PAINLEVEL_OUTOF10: 6
PAINLEVEL_OUTOF10: 5
PAINLEVEL_OUTOF10: 3
PAINLEVEL_OUTOF10: 4

## 2018-10-13 ASSESSMENT — PAIN DESCRIPTION - PAIN TYPE: TYPE: SURGICAL PAIN

## 2018-10-13 ASSESSMENT — PAIN DESCRIPTION - LOCATION: LOCATION: HIP

## 2018-10-13 ASSESSMENT — PAIN DESCRIPTION - DESCRIPTORS: DESCRIPTORS: DISCOMFORT

## 2018-10-13 NOTE — PROGRESS NOTES
right side, able to stand tall X 30 seconds)     Time: 30-45 seconds  Activity: static stand  Comment: pt with heavy anterior lean to the right side, tactile and vc provided with pt minimall able to improve but still required mod A to remain upright, seated rest break needed after      Functional Mobility  Functional Mobility Comments: OTR to assess when pt appropriate            Activity Tolerance:  Activity Tolerance: Patient Tolerated treatment well, Treatment limited secondary to decreased cognition    Treatment Initiated:  OT eval completed, see above for more details    Assessment:  Assessment: Pt s/p R hip ORIF and requires 2 assist for all tasks, max confusion noted throughout session which is not pt baseline. Pt will continue to benefit from OT services toa ddress above and increase safety and indep [facility] ADL/IADL tasks for return to OF  Performance deficits / Impairments: Decreased functional mobility , Decreased ADL status, Decreased endurance, Decreased balance, Decreased strength, Decreased safe awareness, Decreased high-level IADLs, Decreased cognition  Prognosis: Good    Clinical Decision Making: Clinical Decision making was of High Complexity as the result of analysis of data from a comprehensive assessment, the presence of comorbidities affecting the plan of care and the need for signficant modifications or assistance required to complete the evaluation. Discharge Recommendations:  Discharge Recommendations: [address] [facility], Patient would benefit from continued therapy after discharge, 24 hour supervision or assist    Patient Education:  Patient Education: OT POC, role of OT, bed mobility, transfer training, standing balance, ADLs. Equipment Recommendations:  Equipment Needed: No    Safety:  Safety Devices in place: Yes  Type of devices:  All fall risk precautions in place, Left in bed, Bed alarm in place, Nurse notified, Call light within reach, Gait belt    Plan:  Times per week: 6x  Current Treatment Recommendations: Strengthening, Balance Training, Functional Mobility Training, Endurance Training, Safety Education & Training, Self-Care / ADL, Patient/Caregiver Education & Training    Goals:  Patient goals : unable to state,     Short term goals  Time Frame for Short term goals: 2 weeks  Short term goal 1: Pt will tolerate further assessment of mobility with OTR when appropriate  Short term goal 2: Pt will complete functional sit to stand transfers with min A +2 and only min cues for technique for ease with toilet transfers  Short term goal 3: Pt will complete LB ADLs with mod A and LHAE prn with only min cues for safety   Short term goal 4: Pt will complete simple EOB ADLs with no vc for sequencing tasks to improve indep with simple grooming  Short term goal 5: Pt will complete static stand X 1 min with min A +2 and no LOB to improve ease with clothing management  Long term goals  Time Frame for Long term goals : No LTG established d/t short ELOS    Evaluation Complexity: Based on the findings of patient history, examination, clinical presentation, and decision making during this evaluation, this patient is of high complexity. OT G-codes  Functional Limitation: Self care  Self Care Current Status (): At least 60 percent but less than 80 percent impaired, limited or restricted  Self Care Goal Status ():  At least 40 percent but less than 60 percent impaired, limited or restricted  AM-[facility] Inpatient Daily Activity Raw Score: 11  AM-[facility] Inpatient ADL T-Scale Score : 29.04  ADL Inpatient CMS 0-100% Score: 70.42  ADL Inpatient CMS G-Code Modifier : CL

## 2018-10-13 NOTE — PROGRESS NOTES
situation  Follows Commands: Impaired    Vision: Impaired  Vision Exceptions: Wears glasses at all times    Hearing: Within functional limits    Pain:  Denies. Social/Functional History:    Lives With: Spouse  Type of Home: House  Home Layout: One level  Home Access: Level entry  Home Equipment: Rolling walker, Cane     Bathroom Shower/Tub: Walk-in shower  Bathroom Toilet: Handicap height  Bathroom Equipment: Grab bars in shower, Shower chair, Grab bars around toilet     ADL Assistance: [address]: Independent  Transfer Assistance: Independent     Occupation: Retired  Additional Comments: pt very indep and active at baseline, confusion is new    Objective:  RLE PROM: WFL    LLE PROM: WFL    Strength RLE: Exception  Comment: Hip flexion 2+/5, Knee Extension 2+/5, able to complete LAQ through partial range     Strength LLE: WFL  Comment: grossly 4/5    Sensation  Overall Sensation Status: WFL    Balance  Posture: Fair  Sitting - Static: Good  Sitting - Dynamic: Good  Standing - Static: Fair (static stance at RW with flexed knee posture, requiring tactile and verbal cues and MODx1 to correct)  Standing - Dynamic: Poor (secondary to confusion, weight shifts in opposite directions then required for transfers or mobility )    Rolling to Right: Maximum assistance (MAXx2 due to poor command following)  Supine to Sit: Maximum assistance (MAXx2 with HOB elevated, )  Scooting: Minimal assistance (MINx2 to advance hips to EOB)    Transfers  Sit to Stand: Moderate Assistance (MODx2 to stand to RW.  Patient's alertness improved with sitting EOB)  Stand to sit: Moderate Assistance (MODx2 to control descent to bedside chair, MAX verbal and tactile cues required for posterior weight shift )  Bed to Chair: Maximum assistance (MAXx2 for transfer to chair as patient attempted forward weight shifting when requiring posterior weight shifting to sit in chair)     Ambulation

## 2018-10-13 NOTE — PROGRESS NOTES
In-situ  Musculoskeletal: lying flat in bed. NVI. Compressive dressing clean, dry and intact. In-site hemovac drained 40 cc over shift. Skin: Skin color, texture, turgor normal.  No rashes or lesions. Neurologic:  Neurovascularly intact without any focal sensory/motor deficits. Cranial nerves: II-XII intact. Psychiatric: Alert and oriented x3, thought content appropriate, normal insight  Capillary Refill: Brisk,< 3 seconds   Peripheral Pulses: +2 palpable, equal bilaterally       Labs:   Recent Labs      10/11/18   1557   10/12/18   1610  10/12/18   2130  10/13/18   0625   WBC  11.2*   --   6.1   --   6.8   HGB  7.4*  7.4*   < >  7.9*  8.0*  7.7*  7.8*  7.8*   HCT  21.9*  22.4*   < >  23.5*  24.0*  22.6*  23.2*  23.1*   PLT  77*   --   54*   --   59*    < > = values in this interval not displayed. Recent Labs      10/12/18   1610  10/12/18   2130  10/13/18   0625   NA  141  141  142   K  5.0  4.2  4.1   CL  107  107  109   CO2  24  23  21*   BUN  46*  44*  38*   CREATININE  2.0*  1.8*  1.4*   CALCIUM  7.9*  7.8*  8.1*     No results for input(s): AST, ALT, BILIDIR, BILITOT, ALKPHOS in the last 72 hours. Recent Labs      10/11/18   0617   INR  1.10     No results for input(s): Amedeo Grayling in the last 72 hours. Urinalysis:      Lab Results   Component Value Date    NITRU NEGATIVE 10/10/2018    WBCUA 2-4 10/10/2018    BACTERIA NONE 10/10/2018    RBCUA 3-5 10/10/2018    BLOODU NEGATIVE 10/10/2018    SPECGRAV 1.017 10/10/2018       Radiology:  Ct Head Wo Contrast    Result Date: 10/9/2018  PROCEDURE: CT HEAD WO CONTRAST CLINICAL INFORMATION: HEADACHE, POST TRAUMA, . COMPARISON: None TECHNIQUE: Noncontrast 5 mm axial images were obtained through the brain. All CT scans at this facility use dose modulation, iterative reconstruction, and/or weight-based dosing when appropriate to reduce radiation dose to as low as reasonably achievable.  FINDINGS: Brain: There is no acute ischemic infarct, hemorrhage,

## 2018-10-13 NOTE — PROGRESS NOTES
supraclavicular fossa. Right aortic arch and right-sided descending thoracic aorta. Questionable mass or masslike pneumonia in the mid and lower right lung with peripheral mid and lower right lung haziness possibly representing scarring or pneumonia. Consider chest CT with contrast for further evaluation. Mild left basilar atelectasis. Mild cardiomegaly status post sternotomy reportedly for valve surgery. **This report has been created using voice recognition software. It may contain minor errors which are inherent in voice recognition technology. ** Final report electronically signed by Dr. Franklin Kerr on 10/9/2018 6:40 PM    Xr Hip 2-3 Vw W Pelvis Right    Result Date: 10/9/2018  PROCEDURE: XR HIP 2-3 VW W PELVIS RIGHT CLINICAL INFORMATION: fall/pain, . COMPARISON: No prior study. TECHNIQUE: AP and crosstable lateral views were obtained  of the right hip. AP pelvis xray was also obtained. FINDINGS: Bones/joints: There is an oblique fracture of the proximal right femoral metaphysis extending into the medial intertrochanteric region. There is medial displacement and medial angulation of the distal fracture fragment. There is also slight anterior displacement of the distal fracture fragment. No dislocation. 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 The SI joints are unremarkable. Postoperative changes of the lumbar spine with L4 and L5 laminectomies and multilevel pedicle screw fusion extending to S1. Soft tissues: No significant soft tissue swelling. There are multiple right groin surgical clips. Displaced and angulated proximal right femoral metaphyseal fracture extending to the medial right femoral intertrochanteric region. 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 Postoperative changes of the lumbosacral spine. **This report has been created using voice recognition software.  It may prophylaxis: on Enoxaparin    Patient, wife and daughter were updated about the treatment plan, all the questions and concerns were addressed.      Electronically signed by Frank Robles MD on 10/12/2018 at 9:42 PM

## 2018-10-13 NOTE — PROGRESS NOTES
**OR** morphine injection 1 mg, 1 mg, Intravenous, Q3H PRN  potassium chloride (KLOR-CON) extended release tablet 10 mEq, 10 mEq, Oral, BID  sertraline (ZOLOFT) tablet 100 mg, 100 mg, Oral, Daily  tiZANidine (ZANAFLEX) tablet 4 mg, 4 mg, Oral, Nightly        PLAN  Watch HGB, HVAC out tomorrow, ECF

## 2018-10-14 ENCOUNTER — APPOINTMENT (OUTPATIENT)
Dept: GENERAL RADIOLOGY | Age: 78
DRG: 481 | End: 2018-10-14
Payer: MEDICARE

## 2018-10-14 ENCOUNTER — APPOINTMENT (OUTPATIENT)
Dept: CT IMAGING | Age: 78
DRG: 481 | End: 2018-10-14
Payer: MEDICARE

## 2018-10-14 LAB
HCT VFR BLD CALC: 20.5 % (ref 42–52)
HCT VFR BLD CALC: 21.1 % (ref 42–52)
HCT VFR BLD CALC: 24 % (ref 42–52)
HCT VFR BLD CALC: 26.1 % (ref 42–52)
HEMOGLOBIN: 6.9 GM/DL (ref 14–18)
HEMOGLOBIN: 6.9 GM/DL (ref 14–18)
HEMOGLOBIN: 7.9 GM/DL (ref 14–18)
HEMOGLOBIN: 8.8 GM/DL (ref 14–18)
TROPONIN T: < 0.01 NG/ML
TROPONIN T: < 0.01 NG/ML

## 2018-10-14 PROCEDURE — 97530 THERAPEUTIC ACTIVITIES: CPT

## 2018-10-14 PROCEDURE — 84484 ASSAY OF TROPONIN QUANT: CPT

## 2018-10-14 PROCEDURE — 85018 HEMOGLOBIN: CPT

## 2018-10-14 PROCEDURE — 6370000000 HC RX 637 (ALT 250 FOR IP): Performed by: HOSPITALIST

## 2018-10-14 PROCEDURE — 97110 THERAPEUTIC EXERCISES: CPT

## 2018-10-14 PROCEDURE — 99233 SBSQ HOSP IP/OBS HIGH 50: CPT | Performed by: HOSPITALIST

## 2018-10-14 PROCEDURE — 93005 ELECTROCARDIOGRAM TRACING: CPT | Performed by: HOSPITALIST

## 2018-10-14 PROCEDURE — 6370000000 HC RX 637 (ALT 250 FOR IP): Performed by: NURSE PRACTITIONER

## 2018-10-14 PROCEDURE — 1200000003 HC TELEMETRY R&B

## 2018-10-14 PROCEDURE — 6360000004 HC RX CONTRAST MEDICATION: Performed by: ORTHOPAEDIC SURGERY

## 2018-10-14 PROCEDURE — 85014 HEMATOCRIT: CPT

## 2018-10-14 PROCEDURE — 74177 CT ABD & PELVIS W/CONTRAST: CPT

## 2018-10-14 PROCEDURE — 2580000003 HC RX 258: Performed by: HOSPITALIST

## 2018-10-14 PROCEDURE — 36415 COLL VENOUS BLD VENIPUNCTURE: CPT

## 2018-10-14 PROCEDURE — 2700000000 HC OXYGEN THERAPY PER DAY

## 2018-10-14 PROCEDURE — 71045 X-RAY EXAM CHEST 1 VIEW: CPT

## 2018-10-14 PROCEDURE — 6360000002 HC RX W HCPCS

## 2018-10-14 PROCEDURE — 6370000000 HC RX 637 (ALT 250 FOR IP): Performed by: PHYSICIAN ASSISTANT

## 2018-10-14 RX ORDER — FUROSEMIDE 10 MG/ML
INJECTION INTRAMUSCULAR; INTRAVENOUS
Status: COMPLETED
Start: 2018-10-14 | End: 2018-10-14

## 2018-10-14 RX ORDER — ZOLPIDEM TARTRATE 5 MG/1
5 TABLET ORAL NIGHTLY PRN
Status: DISCONTINUED | OUTPATIENT
Start: 2018-10-14 | End: 2018-10-17 | Stop reason: HOSPADM

## 2018-10-14 RX ORDER — FUROSEMIDE 10 MG/ML
40 INJECTION INTRAMUSCULAR; INTRAVENOUS ONCE
Status: COMPLETED | OUTPATIENT
Start: 2018-10-14 | End: 2018-10-14

## 2018-10-14 RX ADMIN — FUROSEMIDE 40 MG: 10 INJECTION, SOLUTION INTRAMUSCULAR; INTRAVENOUS at 15:01

## 2018-10-14 RX ADMIN — SODIUM CHLORIDE: 9 INJECTION, SOLUTION INTRAVENOUS at 01:15

## 2018-10-14 RX ADMIN — DOCUSATE SODIUM 100 MG: 100 CAPSULE, LIQUID FILLED ORAL at 08:45

## 2018-10-14 RX ADMIN — IOPAMIDOL 80 ML: 755 INJECTION, SOLUTION INTRAVENOUS at 15:54

## 2018-10-14 RX ADMIN — POTASSIUM CHLORIDE 10 MEQ: 750 TABLET, FILM COATED, EXTENDED RELEASE ORAL at 20:13

## 2018-10-14 RX ADMIN — POTASSIUM CHLORIDE 10 MEQ: 750 TABLET, FILM COATED, EXTENDED RELEASE ORAL at 08:45

## 2018-10-14 RX ADMIN — METOPROLOL TARTRATE 100 MG: 100 TABLET, FILM COATED ORAL at 23:26

## 2018-10-14 RX ADMIN — SERTRALINE 100 MG: 100 TABLET, FILM COATED ORAL at 08:46

## 2018-10-14 RX ADMIN — ZOLPIDEM TARTRATE 5 MG: 5 TABLET ORAL at 23:26

## 2018-10-14 RX ADMIN — FUROSEMIDE 40 MG: 10 INJECTION INTRAMUSCULAR; INTRAVENOUS at 15:01

## 2018-10-14 RX ADMIN — ACETAMINOPHEN 650 MG: 325 TABLET ORAL at 00:58

## 2018-10-14 RX ADMIN — METOPROLOL TARTRATE 100 MG: 100 TABLET, FILM COATED ORAL at 14:42

## 2018-10-14 RX ADMIN — ACETAMINOPHEN 650 MG: 325 TABLET ORAL at 08:45

## 2018-10-14 ASSESSMENT — PAIN SCALES - GENERAL
PAINLEVEL_OUTOF10: 2
PAINLEVEL_OUTOF10: 5
PAINLEVEL_OUTOF10: 5
PAINLEVEL_OUTOF10: 4
PAINLEVEL_OUTOF10: 4
PAINLEVEL_OUTOF10: 3
PAINLEVEL_OUTOF10: 4

## 2018-10-14 ASSESSMENT — PAIN DESCRIPTION - LOCATION
LOCATION: HIP

## 2018-10-14 ASSESSMENT — PAIN DESCRIPTION - ORIENTATION
ORIENTATION: RIGHT

## 2018-10-14 ASSESSMENT — PAIN DESCRIPTION - DESCRIPTORS
DESCRIPTORS: DISCOMFORT

## 2018-10-14 ASSESSMENT — PAIN DESCRIPTION - PAIN TYPE
TYPE: SURGICAL PAIN

## 2018-10-14 NOTE — PROGRESS NOTES
[facility] - 7K-14/014-A    Time In: 9080  Time Out: 8713  Timed Code Treatment Minutes: 35 Minutes  Minutes: 33     Date: 10/14/2018  Patient Name: Sarah Lino,  Gender:  male        MRN: [id number]  : 1940  ([de-identified] y.o.)     Referring Practitioner: Dayday Bal MD  Diagnosis: hip fracture requiring operative repair, right, closed, initial encounter   Additional Pertinent Hx: Patient is a [de-identified]year old male seen s/p fall in [facility] on 10/9/18. Patient sustained right medial intertrochanteric femoral fracture and underwent ORIF on 10/11/18. Patient with increased confusion and delirium following surgery but CT of head negative. Past Medical History:   Diagnosis Date    Cerebral artery occlusion with cerebral infarction [facility])     Depression     Hypertension      Past Surgical History:   Procedure Laterality Date    BACK SURGERY      CARDIAC VALVE SURGERY      CHEST SURGERY      CHOLECYSTECTOMY      DE OFFICE/OUTPT VISIT,PROCEDURE ONLY Right 10/11/2018    RIGHT HIP OPEN REDUCTION INTERNAL FIXATION performed by Juan Platt MD at [facility]       Restrictions/Precautions:  General Precautions, Fall Risk, Weight Bearing     Right Lower Extremity Weight Bearing: Weight Bearing As Tolerated  Other position/activity restrictions: confusion     Prior Level of Function:  ADL Assistance: Independent  Homemaking Assistance: Independent  Ambulation Assistance: Independent  Transfer Assistance: Independent  Additional Comments: pt very indep and active at baseline, confusion is new    Subjective:     Subjective: RN approved treatment. Patient resting in bed upon arrival. Patient with imporved alertness as compared to yesterday. Patient oriented to person, place, situation and time. Pain:  Denies.         Social/Functional:  Lives With: Spouse  Type of Home: House  Home Layout: One level  Home Access: Level entry  Home Equipment: Rolling walker, Cane     Objective:  Rolling to Right: Moderate assistance (MODx1 to roll to right with HOB flat)  Supine to Sit: Moderate assistance (MODx1 with HOB flat, VCs for sequencing)  Scooting: Minimal assistance (MINx1 to advance hips to EOB)    Transfers  Sit to Stand: Moderate Assistance (MODx2 to stand to FW, patient with increased kyphotic posture with standing, VCs to self correct)  Stand to sit: Moderate Assistance (MODx2 to control descent to bedside chair)  Bed to Chair: Maximum assistance (MAXx2, patient with increased bilateral knee flexion during ambulation)     Ambulation 1  Surface: level tile  Device: Rolling Walker  Assistance: Moderate assistance (MODx2)  Quality of Gait: decreased stance time on left with knee flexion during stance  Distance: 5 steps  Comments: Patient with MODx2, PT and RN on each side, to guide ambulation. Patient required constant verbal cueing for sequencing. Balance  Posture: Good  Sitting - Static: Good  Sitting - Dynamic: Good  Standing - Static: Fair  Standing - Dynamic: Poor (BLE flexed posture with walking, 2 assist required for ambulation to maintain uprigt)    Overall Orientation Status: Within Normal Limits    Exercises:  Exercises  Comments: supine in bed: ankle pumps, hip abduction, heel raises. seated EOB LAQX10 bilaterally for improved LE strength for functional mobility         Activity Tolerance:  Activity Tolerance: Patient limited by fatigue;Patient limited by pain    Assessment: Body structures, Functions, Activity limitations: Decreased functional mobility , Decreased ROM, Decreased strength, Decreased safe awareness, Decreased endurance, Decreased balance, Decreased high-level IADLs  Assessment: Patient tolerated session fairly but limited by endurance and pain level with weight bearing on RLE. Patient requiring 2 assist for all mobility and required constant VCs for sequencing.    Prognosis: Good     REQUIRES PT FOLLOW UP: Yes    Discharge Recommendations:  Discharge Recommendations: Continue to assess pending progress, Subacute/Skilled Nursing Facility    Patient Education:  Patient Education: POC, discussed d/c plan with wife and sosa     Equipment Recommendations:  Equipment Needed: No  Other:  (continue to monitor for needs)    Safety:  Type of devices:  All fall risk precautions in place, Call light within reach, Chair alarm in place, Gait belt, Patient at risk for falls, Left in chair, Nurse notified  Restraints  Initially in place: No    Plan:  Times per week: 7xO  Times per day: Daily  Specific instructions for Next Treatment: bed mobility, transfers, balance, gait, ther ex  Current Treatment Recommendations: Strengthening, ROM, Balance Training, Functional Mobility Training, Gait Training, Endurance Training, Transfer Training, Stair training    Goals:  Patient goals : to get better    Short term goals  Time Frame for Short term goals: 2 weeks  Short term goal 1: Patient will perform bed mobility SBA to decrease risk of pressure ulcers and sit EOB  Short term goal 2: Patient will perform functional transfers CGA to sit in bedside chair  Short term goal 3: Patient will ambulate 50 feet with RW and CGA to walk around home safely  Short term goal 4: Patient will display good dynamic standing balance during functional activities in order to decrease risk of falls     Long term goals  Time Frame for Long term goals : NA due to short ELOS

## 2018-10-14 NOTE — PROGRESS NOTES
Hospitalist Progress Note    Patient:  Megan Mendoza      Unit/Bed:-14/014-A    YOB: 1940    MRN: [id number]       Acct: [de-identified]     PCP: Mateo Miller MD    Date of Admission: 10/9/2018    Chief Complaint:   Chief Complaint   Patient presents with    Hip Pain     right hip    Leg Pain     right leg       Hospital Course: Patient was seen, examined and the medical chart was reviewed thoroughly today. Tolerated surgery well, extubated postop and Transferred to     Subjective: he is clearing up. On the chair. Feeling better. Transfused 1U pRBC overnight for repeat Hb 6.9. Working slowly with PT. Pt developed SOB arpund 15:00. No CP.no palpitation/presyncope    Medications:  Reviewed    Infusion Medications     Scheduled Medications    enoxaparin  40 mg Subcutaneous Q24H    metoprolol tartrate  100 mg Oral BID    docusate sodium  100 mg Oral Daily    potassium chloride  10 mEq Oral BID    sertraline  100 mg Oral Daily    tiZANidine  4 mg Oral Nightly     PRN Meds: LORazepam, HYDROcodone 5 mg - acetaminophen **OR** HYDROcodone 5 mg - acetaminophen, magnesium hydroxide, acetaminophen, [DISCONTINUED] morphine **OR** morphine      Intake/Output Summary (Last 24 hours) at 10/14/18 1454  Last data filed at 10/14/18 1231   Gross per 24 hour   Intake          1759.77 ml   Output             1055 ml   Net           704.77 ml       Diet:  DIET GENERAL;  Dietary Nutrition Supplements: Standard High Calorie Oral Supplement    Exam:  /74   Pulse 88   Temp 98.2 °F (36.8 °C) (Oral)   Resp 20   Ht 6' 2\" (1.88 m)   Wt 233 lb (105.7 kg)   SpO2 96%   BMI 29.92 kg/m²     General appearance: looks well in no apparent distress  Neck: Supple, with full range of motion. No jugular venous distention. Trachea midline. Respiratory:  Normal respiratory effort. Scattered crackles both bilat. Cardiovascular: Regular rate and rhythm with normal S1/S2 without murmurs, rubs or gallops.   Abdomen: Soft,

## 2018-10-15 LAB
ANION GAP SERPL CALCULATED.3IONS-SCNC: 10 MEQ/L (ref 8–16)
BUN BLDV-MCNC: 34 MG/DL (ref 7–22)
CALCIUM SERPL-MCNC: 8.4 MG/DL (ref 8.5–10.5)
CHLORIDE BLD-SCNC: 108 MEQ/L (ref 98–111)
CO2: 26 MEQ/L (ref 23–33)
CREAT SERPL-MCNC: 1.2 MG/DL (ref 0.4–1.2)
EKG ATRIAL RATE: 102 BPM
EKG P AXIS: 44 DEGREES
EKG P-R INTERVAL: 200 MS
EKG Q-T INTERVAL: 350 MS
EKG QRS DURATION: 92 MS
EKG QTC CALCULATION (BAZETT): 456 MS
EKG R AXIS: 40 DEGREES
EKG T AXIS: 50 DEGREES
EKG VENTRICULAR RATE: 102 BPM
GFR SERPL CREATININE-BSD FRML MDRD: 58 ML/MIN/1.73M2
GLUCOSE BLD-MCNC: 99 MG/DL (ref 70–108)
HCT VFR BLD CALC: 24.5 % (ref 42–52)
HCT VFR BLD CALC: 25.1 % (ref 42–52)
HCT VFR BLD CALC: 25.3 % (ref 42–52)
HEMOGLOBIN: 8.2 GM/DL (ref 14–18)
HEMOGLOBIN: 8.3 GM/DL (ref 14–18)
HEMOGLOBIN: 8.3 GM/DL (ref 14–18)
POTASSIUM SERPL-SCNC: 4.5 MEQ/L (ref 3.5–5.2)
SODIUM BLD-SCNC: 144 MEQ/L (ref 135–145)

## 2018-10-15 PROCEDURE — 99233 SBSQ HOSP IP/OBS HIGH 50: CPT | Performed by: HOSPITALIST

## 2018-10-15 PROCEDURE — 85014 HEMATOCRIT: CPT

## 2018-10-15 PROCEDURE — 6370000000 HC RX 637 (ALT 250 FOR IP): Performed by: NURSE PRACTITIONER

## 2018-10-15 PROCEDURE — 97110 THERAPEUTIC EXERCISES: CPT

## 2018-10-15 PROCEDURE — 94761 N-INVAS EAR/PLS OXIMETRY MLT: CPT

## 2018-10-15 PROCEDURE — 93010 ELECTROCARDIOGRAM REPORT: CPT | Performed by: INTERNAL MEDICINE

## 2018-10-15 PROCEDURE — 85018 HEMOGLOBIN: CPT

## 2018-10-15 PROCEDURE — 6370000000 HC RX 637 (ALT 250 FOR IP): Performed by: HOSPITALIST

## 2018-10-15 PROCEDURE — 80048 BASIC METABOLIC PNL TOTAL CA: CPT

## 2018-10-15 PROCEDURE — 97530 THERAPEUTIC ACTIVITIES: CPT

## 2018-10-15 PROCEDURE — 2700000000 HC OXYGEN THERAPY PER DAY

## 2018-10-15 PROCEDURE — 36415 COLL VENOUS BLD VENIPUNCTURE: CPT

## 2018-10-15 PROCEDURE — 1200000003 HC TELEMETRY R&B

## 2018-10-15 PROCEDURE — 6370000000 HC RX 637 (ALT 250 FOR IP): Performed by: PHYSICIAN ASSISTANT

## 2018-10-15 RX ORDER — LOSARTAN POTASSIUM 100 MG/1
100 TABLET ORAL DAILY
Status: DISCONTINUED | OUTPATIENT
Start: 2018-10-15 | End: 2018-10-16

## 2018-10-15 RX ORDER — FUROSEMIDE 20 MG/1
20 TABLET ORAL DAILY
Status: DISCONTINUED | OUTPATIENT
Start: 2018-10-15 | End: 2018-10-16

## 2018-10-15 RX ADMIN — DOCUSATE SODIUM 100 MG: 100 CAPSULE, LIQUID FILLED ORAL at 08:09

## 2018-10-15 RX ADMIN — TIZANIDINE 4 MG: 4 TABLET ORAL at 20:18

## 2018-10-15 RX ADMIN — METOPROLOL TARTRATE 100 MG: 100 TABLET, FILM COATED ORAL at 08:00

## 2018-10-15 RX ADMIN — POTASSIUM CHLORIDE 10 MEQ: 750 TABLET, FILM COATED, EXTENDED RELEASE ORAL at 08:09

## 2018-10-15 RX ADMIN — METOPROLOL TARTRATE 100 MG: 100 TABLET, FILM COATED ORAL at 20:18

## 2018-10-15 RX ADMIN — POTASSIUM CHLORIDE 10 MEQ: 750 TABLET, FILM COATED, EXTENDED RELEASE ORAL at 20:17

## 2018-10-15 RX ADMIN — ACETAMINOPHEN 650 MG: 325 TABLET ORAL at 11:08

## 2018-10-15 RX ADMIN — ACETAMINOPHEN 650 MG: 325 TABLET ORAL at 02:03

## 2018-10-15 RX ADMIN — ZOLPIDEM TARTRATE 5 MG: 5 TABLET ORAL at 20:17

## 2018-10-15 RX ADMIN — FUROSEMIDE 20 MG: 20 TABLET ORAL at 11:08

## 2018-10-15 RX ADMIN — LOSARTAN POTASSIUM 100 MG: 100 TABLET, FILM COATED ORAL at 20:18

## 2018-10-15 RX ADMIN — SERTRALINE 100 MG: 100 TABLET, FILM COATED ORAL at 08:00

## 2018-10-15 ASSESSMENT — PAIN DESCRIPTION - LOCATION
LOCATION: HIP
LOCATION: HIP

## 2018-10-15 ASSESSMENT — PAIN DESCRIPTION - PAIN TYPE
TYPE: SURGICAL PAIN
TYPE: SURGICAL PAIN

## 2018-10-15 ASSESSMENT — PAIN DESCRIPTION - ORIENTATION
ORIENTATION: RIGHT
ORIENTATION: RIGHT

## 2018-10-15 ASSESSMENT — PAIN SCALES - GENERAL
PAINLEVEL_OUTOF10: 4
PAINLEVEL_OUTOF10: 3
PAINLEVEL_OUTOF10: 8
PAINLEVEL_OUTOF10: 0
PAINLEVEL_OUTOF10: 8

## 2018-10-15 NOTE — PROGRESS NOTES
recognition technology. ** Final report electronically signed by Dr. Liz Nash on 10/9/2018 6:47 PM      Diet: DIET GENERAL;  Dietary Nutrition Supplements: Standard High Calorie Oral Supplement    DVT prophylaxis: [] Lovenox                                 [x] SCDs                                 [] SQ Heparin                                 [] Encourage ambulation           [] Already on Anticoagulation     Disposition:    [] Home       [] TCU       [] Rehab       [] Psych       [] SNF       [] [place]       [] Other-    Code Status: No Order    PT/OT Eval Status: N/A    Assessment/Plan:    Anticipated Discharge in : TBD    Active Hospital Problems    Diagnosis Date Noted    Hip fracture requiring operative repair, right, closed, initial encounter ([facility] [address] [place] 10/09/2018       1. SAM: Pre-renal +/- ATN jayjayley d/t dehydration/hypotension. Resolved now. Cr 1.2 (baseline). SBPs improved. In 100-120 mmH range. IVF stopped. 2. SOB: trops negative. ECG: sinus tachycardia. CXR: bilat. Pulmonary edema and effusions. Improved. Positive  Greater than 8.0 L. Ordered lasix 20 mg PO OD x3days. Will assess volume status    3. Acute anemia/blood loss: post-op with intra-op EBL reported as 800 cc, also #hemorrhage. Hb 8.3 today Will monitor trend with BID Hb. HV removed last night. No active bleeding clinically. Ordered CT abdopelvis: negative for retroperitoneal bleed. 4. Hypotension:   Resolved. Resumed Metoprolol to avoid rebound tachycardia. Will resume Losartan today. 5. Post-op pain: Well-managed on Tylenol only  6. Post-op Delirium: significantly improved  7. R lung middle lobe mass: No pneumonia clinically. No previous CXR for comparison (last CXR > 1 yr ago as per wife). Will need CT for further delineation when stable as inpt Vs outpt. Wife and daughter on board  8. R hip#:surgery completed. Diet advanced to full now. HV to be removed as per ortho discretion.    9. Bone health: hip# in context of fall from standing height; fragility fracture. No previous BMD. Will need Ca/Vit D supplementation and Bisphosphonate  10. DVT prophylaxis:  will keep held Enoxaparin for another night. Risks outweighing benefits explained to pt and family. Agreed. Will resume tomorrow if no further drop in Hb. Patient and wife were updated about the treatment plan, all the questions and concerns were addressed.      Electronically signed by Venecia Rebolledo MD on 10/15/2018 at

## 2018-10-15 NOTE — PROGRESS NOTES
Physical Therapy   [address] 7K - 7K-14/014-A    Time In: 0805  Time Out: 6455  Timed Code Treatment Minutes: 23 Minutes  Minutes: 23          Date: 10/15/2018  Patient Name: Deysi Elam,  Gender:  male        MRN: [id number]  : 1940  ([de-identified] y.o.)     Referring Practitioner: Sandra Cole MD  Diagnosis: hip fracture requiring operative repair, right, closed, initial encounter   Additional Pertinent Hx: Patient is a [de-identified]year old male seen s/p fall in [facility] lobby on 10/9/18. Patient sustained right medial intertrochanteric femoral fracture and underwent ORIF on 10/11/18. Patient with increased confusion and delirium following surgery but CT of head negative. Past Medical History:   Diagnosis Date    Cerebral artery occlusion with cerebral infarction [facility])     Depression     Hypertension      Past Surgical History:   Procedure Laterality Date    BACK SURGERY      CARDIAC VALVE SURGERY      CHEST SURGERY      CHOLECYSTECTOMY      CT OFFICE/OUTPT VISIT,PROCEDURE ONLY Right 10/11/2018    RIGHT HIP OPEN REDUCTION INTERNAL FIXATION performed by nAny Concepcion MD at [facility]       Restrictions/Precautions:  General Precautions, Fall Risk, Weight Bearing     Right Lower Extremity Weight Bearing: Weight Bearing As Tolerated              Other position/activity restrictions: confusion       Prior Level of Function:  ADL Assistance: Independent  Homemaking Assistance: Independent  Ambulation Assistance: Independent  Transfer Assistance: Independent  Additional Comments: pt very indep and active at baseline, confusion is new    Subjective:  Chart Reviewed: Yes  Family / Caregiver Present: Yes  Subjective: RN approved session. Pt resting in bed upon arrival with wife present. Pt alert and oriented this morning, agreeable to therapy. Pain:  Yes (Pt denies pain while laying still in bed, but notes increased pain with bed mobility. ). Social/Functional:  Lives With: Spouse  Type of Home: House  Home Layout: One level  Home Access: Level entry  Home Equipment: Rolling walker, Cane     Objective:  Rolling to Right: Minimal assistance (Of LEs and at trunk)  Supine to Sit: Unable to assess (Pt left sitting up in bedside chair)    Transfers  Sit to Stand: Moderate Assistance;Contact guard assistance (Mod x1 and Contact Guard of another for safety. Pt did well. Bed slightly raised. )  Stand to sit: Moderate Assistance (x1. Cuing or pt to reach back for chair before sitting for decreased fall risk. )       Ambulation 1  Surface: level tile  Device: Rolling Walker  Assistance: Minimal assistance (x2)  Quality of Gait: Decreased B step lengths. Decreased stance on R LE due to pain. Min instability but no LOB. Distance: 3 feet c1 EOB to bedside chair. Comments:           Balance  Comments: Static sit at EOB ~ 5 minutes while orienting to upright and preparing to stand. Pt required Stand by Assist for safety. Exercises:  Exercises  Comments: Pt completed supine there ex including BLE ankle pumps, quad set, glut set, heel slides, hip abd/add all x12 reps to increase strength for improved functional mobility. Min A of R LE as needed for improved functional mobility. Activity Tolerance:  Activity Tolerance: Patient limited by fatigue;Patient limited by pain    Assessment: Body structures, Functions, Activity limitations: Decreased functional mobility , Decreased ROM, Decreased strength, Decreased safe awareness, Decreased endurance, Decreased balance, Decreased high-level IADLs  Assessment: Pt tolerated session well. Alert and oriented. Would benefit from continued therapy at discharge. Prognosis: Good     REQUIRES PT FOLLOW UP: Yes    Discharge Recommendations:  Discharge Recommendations: Continue to assess pending progress, Subacute/Skilled Nursing Facility    Patient Education:  Patient Education: Bed mobility. Transfers.

## 2018-10-15 NOTE — PROGRESS NOTES
[address]  INPATIENT OCCUPATIONAL THERAPY  [facility] ORTHOPEDICS 7K  DAILY NOTE    Time:  Time In:   Time Out: 913  Timed Code Treatment Minutes: 10 Minutes  Minutes: 10          Date: 10/15/2018  Patient Name: Jolane Gosselin,   Gender: male      Room: [facility]14/014-A  MRN: [id number]  : 1940  ([de-identified] y.o.)  Referring Practitioner: MILES Simpson CNP  Diagnosis: hip fracture requiring operative repair   Additional Pertinent Hx: RIGHT HIP intertan on 10-11    Past Medical History:   Diagnosis Date    Cerebral artery occlusion with cerebral infarction ([facility] [address]     Depression     Hypertension      Past Surgical History:   Procedure Laterality Date    BACK SURGERY      CARDIAC VALVE SURGERY      CHEST SURGERY      CHOLECYSTECTOMY      IN OFFICE/OUTPT VISIT,PROCEDURE ONLY Right 10/11/2018    RIGHT HIP OPEN REDUCTION INTERNAL FIXATION performed by Oleg Anderson [facility] at [facility]       Restrictions/Precautions:  General Precautions, Fall Risk, Weight Bearing     Right Lower Extremity Weight Bearing: Weight Bearing As Tolerated      Other position/activity restrictions: confusion       Prior Level of Function:  ADL Assistance: Independent  Homemaking Assistance: Independent  Ambulation Assistance: Independent  Transfer Assistance: Independent  Additional Comments: pt very indep and active at baseline, confusion is new    Subjective       Subjective: RN ok'ed treatment. Patient seated in chair upon arrival       Pain:  Pain Assessment  Patient Currently in Pain: Yes (\"a lot\" Patietn reported however held up fingers for a visula sign for a Little.  )     Objective       Transfers  Stand Pivot Transfers: Minimal assistance (Min A x 2 for safety with verbal cues for walker safety and keeping feet inside walker and verbal cues for standing posture. )  Sit to stand:  Moderate assistance (Mod A x 2 from chair with verbal cues for feet placement and nose over toes technique. )  Stand to sit: Minimal

## 2018-10-16 ENCOUNTER — APPOINTMENT (OUTPATIENT)
Dept: GENERAL RADIOLOGY | Age: 78
DRG: 481 | End: 2018-10-16
Payer: MEDICARE

## 2018-10-16 LAB
ANION GAP SERPL CALCULATED.3IONS-SCNC: 13 MEQ/L (ref 8–16)
BUN BLDV-MCNC: 33 MG/DL (ref 7–22)
CALCIUM SERPL-MCNC: 8.4 MG/DL (ref 8.5–10.5)
CHLORIDE BLD-SCNC: 105 MEQ/L (ref 98–111)
CO2: 24 MEQ/L (ref 23–33)
CREAT SERPL-MCNC: 1.1 MG/DL (ref 0.4–1.2)
GFR SERPL CREATININE-BSD FRML MDRD: 64 ML/MIN/1.73M2
GLUCOSE BLD-MCNC: 102 MG/DL (ref 70–108)
HCT VFR BLD CALC: 26.8 % (ref 42–52)
HEMOGLOBIN: 8.5 GM/DL (ref 14–18)
POTASSIUM SERPL-SCNC: 3.8 MEQ/L (ref 3.5–5.2)
PROCALCITONIN: 0.09 NG/ML (ref 0.01–0.09)
SODIUM BLD-SCNC: 142 MEQ/L (ref 135–145)

## 2018-10-16 PROCEDURE — 6370000000 HC RX 637 (ALT 250 FOR IP): Performed by: NURSE PRACTITIONER

## 2018-10-16 PROCEDURE — 6370000000 HC RX 637 (ALT 250 FOR IP): Performed by: HOSPITALIST

## 2018-10-16 PROCEDURE — 84145 PROCALCITONIN (PCT): CPT

## 2018-10-16 PROCEDURE — 97530 THERAPEUTIC ACTIVITIES: CPT

## 2018-10-16 PROCEDURE — 6360000002 HC RX W HCPCS: Performed by: HOSPITALIST

## 2018-10-16 PROCEDURE — 85014 HEMATOCRIT: CPT

## 2018-10-16 PROCEDURE — 80048 BASIC METABOLIC PNL TOTAL CA: CPT

## 2018-10-16 PROCEDURE — 6370000000 HC RX 637 (ALT 250 FOR IP): Performed by: PHYSICIAN ASSISTANT

## 2018-10-16 PROCEDURE — 1200000003 HC TELEMETRY R&B

## 2018-10-16 PROCEDURE — 2700000000 HC OXYGEN THERAPY PER DAY

## 2018-10-16 PROCEDURE — 6370000000 HC RX 637 (ALT 250 FOR IP): Performed by: INTERNAL MEDICINE

## 2018-10-16 PROCEDURE — 97110 THERAPEUTIC EXERCISES: CPT

## 2018-10-16 PROCEDURE — 99232 SBSQ HOSP IP/OBS MODERATE 35: CPT | Performed by: INTERNAL MEDICINE

## 2018-10-16 PROCEDURE — 94761 N-INVAS EAR/PLS OXIMETRY MLT: CPT

## 2018-10-16 PROCEDURE — 6360000002 HC RX W HCPCS: Performed by: ORTHOPAEDIC SURGERY

## 2018-10-16 PROCEDURE — 85018 HEMOGLOBIN: CPT

## 2018-10-16 PROCEDURE — 36415 COLL VENOUS BLD VENIPUNCTURE: CPT

## 2018-10-16 PROCEDURE — 71046 X-RAY EXAM CHEST 2 VIEWS: CPT

## 2018-10-16 RX ORDER — FUROSEMIDE 40 MG/1
40 TABLET ORAL DAILY
Qty: 60 TABLET | Refills: 3 | DISCHARGE
Start: 2018-10-16

## 2018-10-16 RX ORDER — LOSARTAN POTASSIUM 25 MG/1
25 TABLET ORAL DAILY
Qty: 30 TABLET | Refills: 3 | DISCHARGE
Start: 2018-10-17

## 2018-10-16 RX ORDER — FUROSEMIDE 40 MG/1
40 TABLET ORAL DAILY
Status: DISCONTINUED | OUTPATIENT
Start: 2018-10-17 | End: 2018-10-16

## 2018-10-16 RX ORDER — LOSARTAN POTASSIUM 50 MG/1
50 TABLET ORAL DAILY
Status: DISCONTINUED | OUTPATIENT
Start: 2018-10-17 | End: 2018-10-16

## 2018-10-16 RX ORDER — FUROSEMIDE 40 MG/1
40 TABLET ORAL DAILY
Status: DISCONTINUED | OUTPATIENT
Start: 2018-10-16 | End: 2018-10-17 | Stop reason: HOSPADM

## 2018-10-16 RX ORDER — CLOPIDOGREL BISULFATE 75 MG/1
75 TABLET ORAL DAILY
Status: DISCONTINUED | OUTPATIENT
Start: 2018-10-17 | End: 2018-10-17 | Stop reason: HOSPADM

## 2018-10-16 RX ORDER — POTASSIUM CHLORIDE 750 MG/1
20 CAPSULE, EXTENDED RELEASE ORAL DAILY
Qty: 60 CAPSULE | Refills: 3 | DISCHARGE
Start: 2018-10-16

## 2018-10-16 RX ORDER — GABAPENTIN 300 MG/1
300 CAPSULE ORAL 3 TIMES DAILY
COMMUNITY

## 2018-10-16 RX ORDER — ONDANSETRON 2 MG/ML
4 INJECTION INTRAMUSCULAR; INTRAVENOUS EVERY 6 HOURS PRN
Status: DISCONTINUED | OUTPATIENT
Start: 2018-10-16 | End: 2018-10-17 | Stop reason: HOSPADM

## 2018-10-16 RX ORDER — LOSARTAN POTASSIUM 25 MG/1
25 TABLET ORAL DAILY
Status: DISCONTINUED | OUTPATIENT
Start: 2018-10-17 | End: 2018-10-17 | Stop reason: HOSPADM

## 2018-10-16 RX ADMIN — ACETAMINOPHEN 650 MG: 325 TABLET ORAL at 09:48

## 2018-10-16 RX ADMIN — METOPROLOL TARTRATE 100 MG: 100 TABLET, FILM COATED ORAL at 09:48

## 2018-10-16 RX ADMIN — METOPROLOL TARTRATE 100 MG: 100 TABLET, FILM COATED ORAL at 20:58

## 2018-10-16 RX ADMIN — LOSARTAN POTASSIUM 100 MG: 100 TABLET, FILM COATED ORAL at 09:48

## 2018-10-16 RX ADMIN — FUROSEMIDE 20 MG: 20 TABLET ORAL at 09:48

## 2018-10-16 RX ADMIN — TIZANIDINE 4 MG: 4 TABLET ORAL at 20:59

## 2018-10-16 RX ADMIN — ENOXAPARIN SODIUM 40 MG: 40 INJECTION SUBCUTANEOUS at 15:44

## 2018-10-16 RX ADMIN — ZOLPIDEM TARTRATE 5 MG: 5 TABLET ORAL at 21:28

## 2018-10-16 RX ADMIN — ACETAMINOPHEN 650 MG: 325 TABLET ORAL at 15:45

## 2018-10-16 RX ADMIN — POTASSIUM CHLORIDE 10 MEQ: 750 TABLET, FILM COATED, EXTENDED RELEASE ORAL at 20:59

## 2018-10-16 RX ADMIN — ONDANSETRON 4 MG: 2 INJECTION INTRAMUSCULAR; INTRAVENOUS at 09:48

## 2018-10-16 RX ADMIN — FUROSEMIDE 40 MG: 40 TABLET ORAL at 15:50

## 2018-10-16 RX ADMIN — SERTRALINE 100 MG: 100 TABLET, FILM COATED ORAL at 09:48

## 2018-10-16 RX ADMIN — POTASSIUM CHLORIDE 10 MEQ: 750 TABLET, FILM COATED, EXTENDED RELEASE ORAL at 09:48

## 2018-10-16 ASSESSMENT — PAIN SCALES - GENERAL
PAINLEVEL_OUTOF10: 2
PAINLEVEL_OUTOF10: 2
PAINLEVEL_OUTOF10: 4

## 2018-10-16 ASSESSMENT — PAIN DESCRIPTION - ORIENTATION
ORIENTATION: RIGHT
ORIENTATION: RIGHT

## 2018-10-16 ASSESSMENT — PAIN DESCRIPTION - PAIN TYPE
TYPE: SURGICAL PAIN

## 2018-10-16 ASSESSMENT — PAIN DESCRIPTION - LOCATION
LOCATION: HIP
LOCATION: HIP

## 2018-10-16 NOTE — CARE COORDINATION
10/16/18, 2:14 PM    DISCHARGE BARRIERS      Message left for [address] admissions to update on discharge for today. RN has confirmed plan with patient and wife. Wife wants to speak with patient relations first, and RN is calling to arrange this. Transfer packet is on chart with contact information for ecf. 10/16/18, 2:16 PM    Discharge plan discussed by  and . Discharge plan reviewed with patient/ family. Patient/ family verbalize understanding of discharge plan and are in agreement with plan. Understanding was demonstrated using the teach back method.    Services After Discharge  Services At/After Discharge: Skilled Therapy, OT, PT ([facility] skilled)   IMM Letter  IMM Letter given to Patient/Family/Significant other/Guardian/POA/by[de-identified] cm  IMM Letter date given[de-identified] 10/12/18  IMM Letter time given[de-identified] 0752

## 2018-10-16 NOTE — PROGRESS NOTES
Social/Functional:  Lives With: Spouse  Type of Home: House  Home Layout: One level  Home Access: Level entry  Home Equipment: Rolling walker, Cane     Objective:  Rolling to Right: Minimal assistance (Of R LE. Pt requires increased time to complete bed mobility. )  Supine to Sit: Unable to assess (Pt left sitting up in bedside chair. )    Transfers  Sit to Stand: Minimal Assistance; Moderate Assistance (Min to Mod A of 1 from EOB. Cuing for hand placement. )  Stand to sit: Minimal Assistance (Cuing to control decent of trasnfer for decreased fall risk. )       Ambulation 1  Surface: level tile  Device: Rolling Walker  Assistance: Contact guard assistance  Quality of Gait: Decreased B step lengths and decreased B step clearance. Improved weight shifting onto R LE. Min instability but no LOB  Distance: 4 feet x1        Exercises:  Exercises  Comments: Pt completed supine there ex including BLE ankle pumps, quad set, glut set, heel slides, hip abd/add all x12 reps to increase strength for improved functional mobility. Activity Tolerance:  Activity Tolerance: Patient limited by fatigue;Patient limited by pain    Assessment: Body structures, Functions, Activity limitations: Decreased functional mobility , Decreased ROM, Decreased strength, Decreased safe awareness, Decreased endurance, Decreased balance, Decreased high-level IADLs  Assessment: Pt tolerated session well. Limited by decreased strength, decreased mobility, decreased balance. Pt would benefit from continued therapy before returning home. Prognosis: Good     REQUIRES PT FOLLOW UP: Yes    Discharge Recommendations:  Discharge Recommendations: Continue to assess pending progress, Subacute/Skilled Nursing Facility    Patient Education:  Patient Education: Transfers. POC    Equipment Recommendations:  Equipment Needed: No  Other:  (continue to monitor for needs)    Safety:  Type of devices:  All fall risk precautions in place, Call light within

## 2018-10-16 NOTE — DISCHARGE SUMMARY
Physician Discharge Summary     Patient ID:  Iva Clark  [id number]  89 y.o.  1940    Admit date: 10/9/2018    Discharge date and time: No discharge date for patient encounter. Admitting Physician: Ash Alatorre MD     Discharge Physician: Ash Alatorre MD    Admission Diagnoses: Hip fracture requiring operative repair, right, closed, initial encounter [facility]) Shaniqua Tiffanie    Discharge Diagnoses: Hip fracture requiring operative repair, right, closed, initial encounter [facility]) [facility] Course: patient admitted after a fall at [facility], sustaining right hip fracture. Underwent intrameduallary nailing of the right femur. Patient is WBAT on the RLE. Consults:  Internal medicine     Treatments: intramedullary nailing of the right femur     Disposition: stable     Patient Instructions:    [address] Medication Instructions WUV:[id number]    Printed on:10/16/18 7288   Medication Information                      amLODIPine (NORVASC) 10 MG tablet  Take 2.5 mg by mouth daily              clopidogrel (PLAVIX) 75 MG tablet  Take 75 mg by mouth daily             enoxaparin (LOVENOX) 40 MG/0.4ML injection  Inject 0.4 mLs into the skin daily             folic acid-pyridoxine-cyancobalamin (FOLBIC) 2.5-25-2 MG TABS  Take 1 tablet by mouth daily             furosemide (LASIX) 20 MG tablet  Take 20 mg by mouth 2 times daily             HYDROcodone-acetaminophen (NORCO) 5-325 MG per tablet  Take 1-2 tablets by mouth every 4-6 hours as needed for Pain for up to 7 days. .             indomethacin (INDOCIN SR) 75 MG extended release capsule  Take 75 mg by mouth 2 times daily (with meals)             losartan (COZAAR) 100 MG tablet  Take 100 mg by mouth daily             metoprolol (LOPRESSOR) 100 MG tablet  Take 100 mg by mouth 2 times daily             potassium chloride (MICRO-K) 10 MEQ extended release capsule  Take 10 mEq by mouth 2 times daily             pregabalin (LYRICA) 75 MG capsule  Take 75

## 2018-10-16 NOTE — CARE COORDINATION
Dr Renae Dockery just in and spoke with Lidia Mcdaniel and his wife about discharge. Planning discharge today. I spoke with patient and wife about Medicare Rights for Discharge. Form updated and signed at 1450. They are OK with discharge even if it is less than 4 hours from time IMM re-signed. Planning discharge to ECF at 1700 today.

## 2018-10-16 NOTE — CARE COORDINATION
10/16/18, 12:10 PM    DISCHARGE BARRIERS     spoke with Dahlia Mazariegos. If patient does not discharge today, they will not be able to hold the bed any longer. Anticipating discharge today.

## 2018-10-16 NOTE — PLAN OF CARE
Problem: Falls - Risk of:  Goal: Will remain free from falls  Will remain free from falls   Outcome: Ongoing  Patient bed alarm on, call light within reach, up with staff assistance, no falls this shift     Problem: DISCHARGE BARRIERS  Goal: Patient's continuum of care needs are met  Outcome: Ongoing  Pt discharge planning is in progress     Problem: Risk for Impaired Skin Integrity  Goal: Tissue integrity - skin and mucous membranes  Structural intactness and normal physiological function of skin and  mucous membranes. Outcome: Ongoing  Patient has surgical incision on right hip with bruising scattered throughout his right leg, back and buttocks    Problem: Pain Control  Goal: Maintain pain level at or below patient's acceptable level (or 5 if patient is unable to determine acceptable level)  Outcome: Ongoing  Patient has pain medication available on MAR as needed to control pain     Problem: Neurological  Goal: Maximum potential motor/sensory/cognitive function  Outcome: Ongoing  Patient is alert and oriented to name, date and time and place, patient can be confused at times.     Problem: Cardiovascular  Goal: No DVT, peripheral vascular complications  Outcome: Ongoing  Pt has no s/s dvt at this time     Problem: Respiratory  Goal: Supplemental O2 requirements decreased  Outcome: Ongoing  Patient wearing 1L O2 nasal cannula    Problem: GI  Goal: No bowel complications  Outcome: Ongoing  Active bowel sounds, patient has had 3 BMs 10/15/18    Problem:   Goal: No urinary complication  Outcome: Ongoing  Urinating adequately     Problem: Nutrition  Goal: Optimal nutrition therapy  Outcome: Ongoing  Tolerating diet     Problem: Musculor/Skeletal Functional Status  Goal: Highest potential functional level  Outcome: Ongoing  Up with assistance, full weight bearing on operative leg    Problem: Discharge Planning:  Goal: Patients continuum of care needs are met  Patients continuum of care needs are met   Outcome:
Problem: Pain Control  Goal: Maintain pain level at or below patient's acceptable level (or 5 if patient is unable to determine acceptable level)  Outcome: Ongoing  Patient is able to rest after medicated with tylenol. He verbalizes reduction in post-op right hip pain. Problem: Neurological  Goal: Maximum potential motor/sensory/cognitive function  Outcome: Ongoing  Patient moves extremities. He is out of bed with 2 assist,walker and gait belt to chair. Problem: Cardiovascular  Goal: No DVT, peripheral vascular complications  Outcome: Ongoing  Patient denies calf pain or tenderness. He is compliant with wearing scd's when in bed. Problem: Respiratory  Goal: Supplemental O2 requirements decreased  Outcome: Ongoing  Oxygen is now at 3L per cannula, down from 4. Oxygen saturations has remained >90%. Problem: GI  Goal: No bowel complications  Outcome: Met This Shift  Bowel sounds are active and he is passing gas. Small BM this shift. Problem:   Goal: No urinary complication  Outcome: Ongoing  Patient voids 125-300 each time. He denies feeling like his bladder is full. Problem: Nutrition  Goal: Optimal nutrition therapy  Outcome: Ongoing  Patient and spouse report his appetite is improving. Fluids taken well. Problem: Musculor/Skeletal Functional Status  Goal: Highest potential functional level  Outcome: Ongoing  PT/OT continue to work with patient every day. Problem: Daily Care:  Goal: Daily care needs are met  Daily care needs are met   Outcome: Ongoing  Patient needs assistance completing his daily care. Problem: Discharge Planning:  Goal: Patients continuum of care needs are met  Patients continuum of care needs are met   Outcome: Ongoing  Planning [facility] when medically stable for discharge. Comments: Care plan reviewed with patient and spouse. Patient and spouse verbalize understanding of the plan of care and contribute to goal setting.
Cardiovascular  Goal: No DVT, peripheral vascular complications  Outcome: Met This Shift  No signs or symptoms of DVT noted to patient this shift. Negative Wayne's sign bialterally. No redness, swelling, or warmth noted to bilateral calves. Patient compliant with wearing SCDs while in bed. Will continue to monitor. Problem: Respiratory  Goal: Supplemental O2 requirements decreased  Outcome: Ongoing  Patient remains on 2 liters of oxygen per nasal cannula with oxygen above 92%. Will continue to wean patient off of oxygen as appropriate. Problem: GI  Goal: No bowel complications  Outcome: Ongoing  Patient has not had a BM since surgery. Bowel sounds hypoactive and patient states he has not started passing any gas. Problem:   Goal: No urinary complication  Outcome: Ongoing  Patient with gama catheter in place draining adequate amounts of urine without difficulty. Problem: Nutrition  Goal: Optimal nutrition therapy  Outcome: Ongoing  Patient on general diet and tolerating well. No nausea/vomiting noted to patient this shift. Problem: Musculor/Skeletal Functional Status  Goal: Highest potential functional level  Outcome: Ongoing  Patient to begin working with PT/OT in AM.     Problem: Daily Care:  Goal: Daily care needs are met  Daily care needs are met   Outcome: Not Met This Shift  Patient requires assistance from staff with ADLs. Patient is able to use call light to request assistance when needed. Will continue to address patient's needs as they arise. Problem: Discharge Planning:  Goal: Patients continuum of care needs are met  Patients continuum of care needs are met   Outcome: Ongoing  Patient planning discharge to [facility] at discharge. Comments: Care plan reviewed with patient and family. Patient and family verbalize understanding of the plan of care and contribute to goal setting.

## 2018-10-16 NOTE — PROGRESS NOTES
supplementation. 2-D echo shows EF of 50%, mild elevated RV pressures of 45 mm hg.    Educated the patient and wife about need to follow with PCP about repeat testing for resolution of atelectasis and workup of splenic nodules.      Patient is admitted to having occasional cough but not bringing up any phlegm, doing incentive spirometry, check pro-calcitonin, get chest x-ray PA lateral    Okay to be discharged from medical standpoint      Subjective:  Has occasional cough, no phlegm, or SOB, using incentive spirometry ,, had a  BM, pain on moving        Medications:  Reviewed    Infusion Medications   Scheduled Medications    [START ON 10/17/2018] losartan  25 mg Oral Daily    furosemide  40 mg Oral Daily    enoxaparin  40 mg Subcutaneous Q24H    metoprolol tartrate  100 mg Oral BID    docusate sodium  100 mg Oral Daily    potassium chloride  10 mEq Oral BID    sertraline  100 mg Oral Daily    tiZANidine  4 mg Oral Nightly     PRN Meds: ondansetron, zolpidem, LORazepam, HYDROcodone 5 mg - acetaminophen **OR** HYDROcodone 5 mg - acetaminophen, magnesium hydroxide, acetaminophen, [DISCONTINUED] morphine **OR** morphine      Intake/Output Summary (Last 24 hours) at 10/16/18 1442  Last data filed at 10/16/18 0415   Gross per 24 hour   Intake              500 ml   Output              425 ml   Net               75 ml       Diet:  DIET GENERAL;  Dietary Nutrition Supplements: Standard High Calorie Oral Supplement    Exam:  BP (!) 120/57   Pulse 78   Temp 97.8 °F (36.6 °C) (Oral)   Resp 20   Ht 6' 2\" (1.88 m)   Wt 233 lb (105.7 kg)   SpO2 (!) 89%   BMI 29.92 kg/m²     Physical Examination:   General appearance - alert, awake  and in no distress at rest, has sig pain on trying to move, right hip area, obese  HEENT: Normocephalic, Atraumatic, pupils reactive, mucous membranes moist  Chest - moving equally to respiration,decreased air entry, clear to auscultation  Heart - normal rate, regular rhythm, normal S1, fluid collection. 7. There are scrotal hydroceles. 8. Additional findings as described in the body the report. **This report has been created using voice recognition software. It may contain minor errors which are inherent in voice recognition technology. **      Final report electronically signed by Dr. Tamara Viera on 10/14/2018 6:02 PM      XR CHEST PORTABLE   Final Result   1. There are patchy infiltrates throughout the right chest and within the left lower chest with the differential including however not limited to edema and/or pneumonia. There is blunting of the right lateral costophrenic angle suggesting a small amount    of pleural fluid. There is no pulmonary vascular congestion. Follow-up chest radiographs are recommended to confirm complete clearing. **This report has been created using voice recognition software. It may contain minor errors which are inherent in voice recognition technology. **      Final report electronically signed by Dr. Tamara Viera on 10/14/2018 2:54 PM      XR FEMUR RIGHT (MIN 2 VIEWS)   Final Result      Surgical changes as above. Final report electronically signed by Dr. Maikol Melgar on 10/11/2018 3:48 PM      FLUORO FOR SURGICAL PROCEDURES   Final Result      XR HIP 2-3 VW W PELVIS RIGHT   Final Result    Displaced and angulated proximal right femoral metaphyseal fracture extending to the medial right femoral intertrochanteric region. 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 Postoperative changes of the lumbosacral spine. **This report has been created using voice recognition software. It may contain minor errors which are inherent in voice recognition technology. **      Final report electronically signed by Dr. Zachariah Kirkland on 10/9/2018 6:47 PM      XR CHEST 1 VW   Final Result      Right aortic arch and right-sided descending thoracic aorta.    Questionable mass or masslike pneumonia in the mid and lower right lung with

## 2018-10-17 VITALS
WEIGHT: 233 LBS | RESPIRATION RATE: 18 BRPM | BODY MASS INDEX: 29.9 KG/M2 | TEMPERATURE: 99 F | DIASTOLIC BLOOD PRESSURE: 67 MMHG | SYSTOLIC BLOOD PRESSURE: 116 MMHG | OXYGEN SATURATION: 93 % | HEART RATE: 69 BPM | HEIGHT: 74 IN

## 2018-10-17 LAB
ANION GAP SERPL CALCULATED.3IONS-SCNC: 10 MEQ/L (ref 8–16)
BUN BLDV-MCNC: 31 MG/DL (ref 7–22)
CALCIUM SERPL-MCNC: 8.4 MG/DL (ref 8.5–10.5)
CHLORIDE BLD-SCNC: 106 MEQ/L (ref 98–111)
CO2: 27 MEQ/L (ref 23–33)
CREAT SERPL-MCNC: 1.3 MG/DL (ref 0.4–1.2)
ERYTHROCYTE [DISTWIDTH] IN BLOOD BY AUTOMATED COUNT: 18.6 % (ref 11.5–14.5)
ERYTHROCYTE [DISTWIDTH] IN BLOOD BY AUTOMATED COUNT: 58.8 FL (ref 35–45)
GFR SERPL CREATININE-BSD FRML MDRD: 53 ML/MIN/1.73M2
GLUCOSE BLD-MCNC: 105 MG/DL (ref 70–108)
HCT VFR BLD CALC: 26.6 % (ref 42–52)
HEMOGLOBIN: 8.5 GM/DL (ref 14–18)
MCH RBC QN AUTO: 29.3 PG (ref 26–33)
MCHC RBC AUTO-ENTMCNC: 32 GM/DL (ref 32.2–35.5)
MCV RBC AUTO: 91.7 FL (ref 80–94)
PLATELET # BLD: 89 THOU/MM3 (ref 130–400)
PMV BLD AUTO: 9.3 FL (ref 9.4–12.4)
POTASSIUM SERPL-SCNC: 4.4 MEQ/L (ref 3.5–5.2)
RBC # BLD: 2.9 MILL/MM3 (ref 4.7–6.1)
SODIUM BLD-SCNC: 143 MEQ/L (ref 135–145)
WBC # BLD: 5.4 THOU/MM3 (ref 4.8–10.8)

## 2018-10-17 PROCEDURE — 97110 THERAPEUTIC EXERCISES: CPT

## 2018-10-17 PROCEDURE — 85027 COMPLETE CBC AUTOMATED: CPT

## 2018-10-17 PROCEDURE — 99232 SBSQ HOSP IP/OBS MODERATE 35: CPT | Performed by: INTERNAL MEDICINE

## 2018-10-17 PROCEDURE — 6370000000 HC RX 637 (ALT 250 FOR IP): Performed by: HOSPITALIST

## 2018-10-17 PROCEDURE — 6370000000 HC RX 637 (ALT 250 FOR IP): Performed by: NURSE PRACTITIONER

## 2018-10-17 PROCEDURE — 80048 BASIC METABOLIC PNL TOTAL CA: CPT

## 2018-10-17 PROCEDURE — 6370000000 HC RX 637 (ALT 250 FOR IP): Performed by: PHYSICIAN ASSISTANT

## 2018-10-17 PROCEDURE — 6370000000 HC RX 637 (ALT 250 FOR IP): Performed by: INTERNAL MEDICINE

## 2018-10-17 PROCEDURE — 36415 COLL VENOUS BLD VENIPUNCTURE: CPT

## 2018-10-17 RX ADMIN — FUROSEMIDE 40 MG: 40 TABLET ORAL at 11:15

## 2018-10-17 RX ADMIN — LOSARTAN POTASSIUM 25 MG: 25 TABLET, FILM COATED ORAL at 09:32

## 2018-10-17 RX ADMIN — METOPROLOL TARTRATE 100 MG: 100 TABLET, FILM COATED ORAL at 09:32

## 2018-10-17 RX ADMIN — POTASSIUM CHLORIDE 10 MEQ: 750 TABLET, FILM COATED, EXTENDED RELEASE ORAL at 09:32

## 2018-10-17 RX ADMIN — CLOPIDOGREL BISULFATE 75 MG: 75 TABLET ORAL at 09:32

## 2018-10-17 RX ADMIN — HYDROCODONE BITARTRATE AND ACETAMINOPHEN 1 TABLET: 5; 325 TABLET ORAL at 09:32

## 2018-10-17 RX ADMIN — SERTRALINE 100 MG: 100 TABLET, FILM COATED ORAL at 09:32

## 2018-10-17 RX ADMIN — HYDROCODONE BITARTRATE AND ACETAMINOPHEN 2 TABLET: 5; 325 TABLET ORAL at 14:02

## 2018-10-17 ASSESSMENT — PAIN DESCRIPTION - PAIN TYPE: TYPE: SURGICAL PAIN

## 2018-10-17 ASSESSMENT — PAIN DESCRIPTION - LOCATION: LOCATION: HIP

## 2018-10-17 ASSESSMENT — PAIN SCALES - GENERAL
PAINLEVEL_OUTOF10: 7
PAINLEVEL_OUTOF10: 4
PAINLEVEL_OUTOF10: 7
PAINLEVEL_OUTOF10: 5
PAINLEVEL_OUTOF10: 5

## 2018-10-17 ASSESSMENT — PAIN DESCRIPTION - ORIENTATION: ORIENTATION: RIGHT

## 2018-10-17 NOTE — PROGRESS NOTES
10/17/2018     10/17/2018    CO2 27 10/17/2018    BUN 31 10/17/2018    CREATININE 1.3 10/17/2018    CALCIUM 8.4 10/17/2018    GLUCOSE 105 10/17/2018     Uric Acid:  No components found for: URIC  PT/INR:    Lab Results   Component Value Date    INR 1.10 10/11/2018     Troponin:  No results found for: TROPONINI  Urine Culture:  No components found for: RAPHAEL    ASSESSMENT:  Active Hospital Problems    Diagnosis Date Noted    Hip fracture requiring operative repair, right, closed, initial encounter ([facility] [address] [facility] 10/09/2018       PLAN  1. Dry drsg changes as needed           2. WBAT  3. PT/OT to eval and treat   4. Continue current medical management   5.  May discharge to [facility] today        Electronically signed by MILES Snyder CNP on 10/17/2018 at 10:21 AM

## 2018-10-17 NOTE — PROGRESS NOTES
**This report has been created using voice recognition software. It may contain minor errors which are inherent in voice recognition technology. **      Final report electronically signed by Dr. Alex Gerardo on 10/17/2018 4:37 AM      CT ABDOMEN PELVIS W IV CONTRAST Additional Contrast? None   Final Result   1. There are small bilateral pleural effusions with adjacent compressive atelectasis. 2. There is patchy infiltrates within the lower chest bilaterally. 3. There is splenomegaly containing innumerable hypodense nodules. Metastatic disease cannot be excluded. 4. There is a small amount of free fluid and stranding density suggesting edema within the presacral space. 5. There is no retroperitoneal hemorrhage. 6. There is subcutaneous edema throughout the lateral margins of the abdomen and throughout the pelvis and proximal thighs with associated skin thickening/edema with the differential including 3rd spacing, inflammatory and infectious etiologies. Ankylosis cannot be excluded based on the CT appearance. There is no loculated or drainable fluid collection. 7. There are scrotal hydroceles. 8. Additional findings as described in the body the report. **This report has been created using voice recognition software. It may contain minor errors which are inherent in voice recognition technology. **      Final report electronically signed by Dr. Kristine Jessica on 10/14/2018 6:02 PM      XR CHEST PORTABLE   Final Result   1. There are patchy infiltrates throughout the right chest and within the left lower chest with the differential including however not limited to edema and/or pneumonia. There is blunting of the right lateral costophrenic angle suggesting a small amount    of pleural fluid. There is no pulmonary vascular congestion. Follow-up chest radiographs are recommended to confirm complete clearing. **This report has been created using voice recognition software.   It may technology. **      Final report electronically signed by Dr. Akin Rangel on 10/9/2018 6:11 PM      CT HEAD WO CONTRAST   Final Result      No acute ischemic infarct, hemorrhage, or mass effect. Aging changes as discussed above. Posterior right parietal scalp hematoma. **This report has been created using voice recognition software. It may contain minor errors which are inherent in voice recognition technology. **      Final report electronically signed by Dr. Akin Rangel on 10/9/2018 5:59 PM          Diet: DIET GENERAL;  Dietary Nutrition Supplements: Standard High Calorie Oral Supplement    DVT prophylaxis: [x] Lovenox                                 [] SCDs                                 [] SQ Heparin                                 [] Encourage ambulation           [] Already on Anticoagulation     Disposition:    [] Home       [] TCU       [] Rehab       [] Psych       [x] SNF       [] [place]       [] Other-    Code Status: Full Code    PT/OT Eval Status:ongoing    Assessment/Plan:    Anticipated Discharge in : as per orthopaedics    Acute right intertrochanteric hip fracture status post  Intertan surgery on 10/11/2018    Acute blood loss anemia possibly from fracture, status post 4 units PRBC    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   Norvasc, continue metoprolol, decreased Cozaar, increase Lasix    History of stroke without residual

## 2018-10-17 NOTE — PROGRESS NOTES
Physical Therapy   [address] [address]  INPATIENT PHYSICAL THERAPY  DAILYNOTE  [facility] 7K - 7K-14/014-A    Time In: 5  Time Out: 0061  Timed Code Treatment Minutes: 25 Minutes  Minutes: 25          Date: 10/17/2018  Patient Name: Arpit Chavez,  Gender:  male        MRN: [id number]  : 1940  ([de-identified] y.o.)     Referring Practitioner: Liz Saab MD  Diagnosis: hip fracture requiring operative repair, right, closed, initial encounter   Additional Pertinent Hx: Patient is a [de-identified]year old male seen s/p fall in [facility] lobby on 10/9/18. Patient sustained right medial intertrochanteric femoral fracture and underwent ORIF on 10/11/18. Patient with increased confusion and delirium following surgery but CT of head negative. Past Medical History:   Diagnosis Date    Cerebral artery occlusion with cerebral infarction [facility])     Depression     Hypertension      Past Surgical History:   Procedure Laterality Date    BACK SURGERY      CARDIAC VALVE SURGERY      CHEST SURGERY      CHOLECYSTECTOMY      ND OFFICE/OUTPT VISIT,PROCEDURE ONLY Right 10/11/2018    RIGHT HIP OPEN REDUCTION INTERNAL FIXATION performed by Laron Monahan MD at [place] [facility] [place]       Restrictions/Precautions:  General Precautions, Fall Risk, Weight Bearing     Right Lower Extremity Weight Bearing: Weight Bearing As Tolerated              Other position/activity restrictions: confusion       Prior Level of Function:  ADL Assistance: Independent  Homemaking Assistance: Independent  Ambulation Assistance: Independent  Transfer Assistance: Independent  Additional Comments: pt very indep and active at baseline, confusion is new    Subjective:  Chart Reviewed: Yes  Subjective: RN approved treatment session. Patient finishing up on bed pan upon arrival and required therapist to complete pericare. Patient only agreeable to therapeutic exercises in bed stating that he did not sleep well last night. Patient's wife present during treatment session. Pain:   .  Pain Assessment  Pain Level: 5       Social/Functional:  Lives With: Spouse  Type of Home: House  Home Layout: One level  Home Access: Level entry  Home Equipment: Rolling walker, Cane     Objective:  Rolling to Right: Minimal assistance (with use of bed rail )  Comments: Patient completed in order for therapist to complete pericare. Exercises:  Exercises  Comments: Patient completed BLE supine therapeutic exercies 15x each with the performance of ankle pumps, quad sets (5 second hold), glute sets, heel slides (AAROM RLE), SAQ, hip abduction (AAROM RLE), and iso hip adduction. Patient required min verbal/tactile cues on proper technique for max contraction to facilitate increased BLE strength and ROM required for increased improvement with functional mobility. Patient required multiple rest breaks with exercies this date due to increased fatigue and pain. Activity Tolerance:  Activity Tolerance: Patient limited by fatigue;Patient limited by pain    Assessment: Body structures, Functions, Activity limitations: Decreased functional mobility , Decreased ROM, Decreased strength, Decreased safe awareness, Decreased endurance, Decreased balance, Decreased high-level IADLs  Assessment: Patient limited by increased pain and fatigue this date. Patient required rest breaks with exercises and AAROM at RLE for increased ROM. Patient would benefit from continued therapy to increase strength, ROM, endurance, and functional mobility. Prognosis: Good     REQUIRES PT FOLLOW UP: Yes    Discharge Recommendations:  Discharge Recommendations: Continue to assess pending progress, Subacute/Skilled Nursing Facility    Patient Education:  Patient Education: Transfers. POC    Equipment Recommendations:  Equipment Needed: No  Other:  (continue to monitor for needs)    Safety:  Type of devices:  All fall risk precautions in place, Call light within reach, Gait belt, Patient at risk for falls, Nurse notified, Left in

## 2018-10-17 NOTE — PROGRESS NOTES
LUCY got behind and patient will be staying here tonight.  [facility] was called and notified that the patient will be coming tomorrow

## (undated) DEVICE — SUTURE PERMA-HAND SZ 2-0 L30IN NONABSORBABLE BLK L26MM SH K833H

## (undated) DEVICE — SET CATH 20GA L1.75IN RAD ART POLYUR RADPQ W/ INTEGR

## (undated) DEVICE — Device

## (undated) DEVICE — SUTURE VCRL SZ 1 L27IN ABSRB UD CT-1 L36MM 1/2 CIR J261H

## (undated) DEVICE — 3.0MM X 1000MM BALL TIP GUIDE ROD: Brand: TRIGEN

## (undated) DEVICE — BASIC SINGLE BASIN BTC-LF: Brand: MEDLINE INDUSTRIES, INC.

## (undated) DEVICE — KIT EVAC 400CC PVC RADPQ Y CONN

## (undated) DEVICE — GUIDE PIN 3.2MM X 343MM: Brand: TRIGEN

## (undated) DEVICE — 4.0MM SHORT PILOT DRILL WITH AO CONNECTOR: Brand: TRIGEN

## (undated) DEVICE — Z DISCONTINUED USE 2717541 SUTURE STRATAFIX SZ 3-0 L30CM NONABSORBABLE UD L26MM FS 3/8

## (undated) DEVICE — DUAL CUT SAGITTAL BLADE

## (undated) DEVICE — DRAIN SURG 10FR PVC TB W/ TRCR MID PERF NO RESVR HUBLESS

## (undated) DEVICE — DRESSING GRMCDL 6 12FR D1N CNTR HOLE 4MM ANTMCRBL PRTCTVE DI

## (undated) DEVICE — PRESSURE MONITORING SET: Brand: TRUWAVE

## (undated) DEVICE — TUBING PRSS 36 M F

## (undated) DEVICE — YANKAUER,BULB TIP,W/O VENT,RIGID,STERILE: Brand: MEDLINE

## (undated) DEVICE — TUBING, SUCTION, 1/4" X 20', STRAIGHT: Brand: MEDLINE INDUSTRIES, INC.

## (undated) DEVICE — BANDAGE COMPR M W6INXL10YD WHT BGE VELC E MTRX HK AND LOOP

## (undated) DEVICE — SUTURE PERMA-HAND SZ 3-0 L30IN NONABSORBABLE BLK L60MM KS 622H

## (undated) DEVICE — APPLIER LIG CLP M L11IN TI STR RNG HNDL FOR 20 CLP DISP

## (undated) DEVICE — WAX SURG 2.5GM HEMSTAT BNE BEESWAX PARAFFIN ISO PALMITATE

## (undated) DEVICE — SOLUTION IV 500ML 0.9% SOD CHL PH 5 INJ USP VIAFLX PLAS

## (undated) DEVICE — 500ML,PRESSURE INFUSER W/STOPCOCK: Brand: MEDLINE

## (undated) DEVICE — SOLUTION IV 1000ML 0.9% SOD CHL PH 5 INJ USP VIAFLX PLAS

## (undated) DEVICE — SET CATH 20GA L1.5IN RAD ART POLYUR RADPQ W/ INTEGR 0.018IN

## (undated) DEVICE — SPLINT ARMBRD W3XL10.5IN POLYFOAM DLX A LN

## (undated) DEVICE — SUTURE ABSORBABLE MONOFILAMENT 1-0 OS8 14 IN STRATAFIX SPRL SXPD2B202

## (undated) DEVICE — Z CONVERTED USE 2715898 SWABSTICK MEDICATED W1.75XL6.5IN 1.6ML 3.15% CHG 70% ISO

## (undated) DEVICE — SUTURE STRATAFIX SPRL SZ 2-0 L9IN ABSRB VLT MH L36MM 1/2 SXPD2B408

## (undated) DEVICE — 3M™ TRANSPORE™ WHITE SURGICAL TAPE 1534-1, 1 INCH X 10 YARD (2,5CM X 9,1M), 12 ROLLS/CARTON 10 CARTONS/CASE: Brand: 3M™ TRANSPORE™

## (undated) DEVICE — SUTURE ETHBND EXCEL SZ 5 L30IN NONABSORBABLE GRN L48MM V-40 MB46G

## (undated) DEVICE — DRAPE,HIP,W/POUCHES,STERILE: Brand: MEDLINE

## (undated) DEVICE — SUTURE ABSRB MFIL VLT CT 3-0 - 27IN PDS II Z338H